# Patient Record
Sex: FEMALE | Race: WHITE | NOT HISPANIC OR LATINO | Employment: UNEMPLOYED | URBAN - METROPOLITAN AREA
[De-identification: names, ages, dates, MRNs, and addresses within clinical notes are randomized per-mention and may not be internally consistent; named-entity substitution may affect disease eponyms.]

---

## 2020-09-03 ENCOUNTER — COMMUNICATION - HEALTHEAST (OUTPATIENT)
Dept: ADDICTION MEDICINE | Facility: CLINIC | Age: 35
End: 2020-09-03

## 2020-09-08 ENCOUNTER — AMBULATORY - HEALTHEAST (OUTPATIENT)
Dept: ADDICTION MEDICINE | Facility: HOSPITAL | Age: 35
End: 2020-09-08

## 2020-09-08 ENCOUNTER — OFFICE VISIT - HEALTHEAST (OUTPATIENT)
Dept: ADDICTION MEDICINE | Facility: HOSPITAL | Age: 35
End: 2020-09-08

## 2020-09-08 DIAGNOSIS — F15.20 AMPHETAMINE USE DISORDER, SEVERE (H): ICD-10-CM

## 2020-09-11 ENCOUNTER — AMBULATORY - HEALTHEAST (OUTPATIENT)
Dept: ADDICTION MEDICINE | Facility: HOSPITAL | Age: 35
End: 2020-09-11

## 2020-09-14 ENCOUNTER — COMMUNICATION - HEALTHEAST (OUTPATIENT)
Dept: ADDICTION MEDICINE | Facility: HOSPITAL | Age: 35
End: 2020-09-14

## 2020-09-15 ENCOUNTER — COMMUNICATION - HEALTHEAST (OUTPATIENT)
Dept: ADDICTION MEDICINE | Facility: HOSPITAL | Age: 35
End: 2020-09-15

## 2020-09-16 ENCOUNTER — OFFICE VISIT - HEALTHEAST (OUTPATIENT)
Dept: ADDICTION MEDICINE | Facility: HOSPITAL | Age: 35
End: 2020-09-16

## 2020-09-16 DIAGNOSIS — F15.20 AMPHETAMINE USE DISORDER, SEVERE (H): ICD-10-CM

## 2020-09-17 ENCOUNTER — COMMUNICATION - HEALTHEAST (OUTPATIENT)
Dept: ADDICTION MEDICINE | Facility: HOSPITAL | Age: 35
End: 2020-09-17

## 2020-09-17 ENCOUNTER — AMBULATORY - HEALTHEAST (OUTPATIENT)
Dept: ADDICTION MEDICINE | Facility: HOSPITAL | Age: 35
End: 2020-09-17

## 2020-09-17 ENCOUNTER — OFFICE VISIT - HEALTHEAST (OUTPATIENT)
Dept: ADDICTION MEDICINE | Facility: HOSPITAL | Age: 35
End: 2020-09-17

## 2020-09-17 DIAGNOSIS — F15.20 AMPHETAMINE USE DISORDER, SEVERE (H): ICD-10-CM

## 2020-09-18 ENCOUNTER — AMBULATORY - HEALTHEAST (OUTPATIENT)
Dept: ADDICTION MEDICINE | Facility: HOSPITAL | Age: 35
End: 2020-09-18

## 2020-09-21 ENCOUNTER — OFFICE VISIT - HEALTHEAST (OUTPATIENT)
Dept: ADDICTION MEDICINE | Facility: HOSPITAL | Age: 35
End: 2020-09-21

## 2020-09-21 ENCOUNTER — AMBULATORY - HEALTHEAST (OUTPATIENT)
Dept: LAB | Facility: HOSPITAL | Age: 35
End: 2020-09-21

## 2020-09-21 DIAGNOSIS — F15.20 AMPHETAMINE USE DISORDER, SEVERE (H): ICD-10-CM

## 2020-09-23 ENCOUNTER — AMBULATORY - HEALTHEAST (OUTPATIENT)
Dept: ADDICTION MEDICINE | Facility: HOSPITAL | Age: 35
End: 2020-09-23

## 2020-09-23 ENCOUNTER — OFFICE VISIT - HEALTHEAST (OUTPATIENT)
Dept: ADDICTION MEDICINE | Facility: HOSPITAL | Age: 35
End: 2020-09-23

## 2020-09-23 DIAGNOSIS — F15.20 AMPHETAMINE USE DISORDER, SEVERE (H): ICD-10-CM

## 2020-09-24 ENCOUNTER — OFFICE VISIT - HEALTHEAST (OUTPATIENT)
Dept: ADDICTION MEDICINE | Facility: HOSPITAL | Age: 35
End: 2020-09-24

## 2020-09-24 ENCOUNTER — COMMUNICATION - HEALTHEAST (OUTPATIENT)
Dept: ADDICTION MEDICINE | Facility: HOSPITAL | Age: 35
End: 2020-09-24

## 2020-09-24 DIAGNOSIS — F15.20 AMPHETAMINE USE DISORDER, SEVERE (H): ICD-10-CM

## 2020-09-30 ENCOUNTER — AMBULATORY - HEALTHEAST (OUTPATIENT)
Dept: ADDICTION MEDICINE | Facility: HOSPITAL | Age: 35
End: 2020-09-30

## 2020-09-30 ENCOUNTER — COMMUNICATION - HEALTHEAST (OUTPATIENT)
Dept: ADDICTION MEDICINE | Facility: HOSPITAL | Age: 35
End: 2020-09-30

## 2020-10-02 ENCOUNTER — AMBULATORY - HEALTHEAST (OUTPATIENT)
Dept: ADDICTION MEDICINE | Facility: HOSPITAL | Age: 35
End: 2020-10-02

## 2020-10-05 ENCOUNTER — OFFICE VISIT - HEALTHEAST (OUTPATIENT)
Dept: ADDICTION MEDICINE | Facility: HOSPITAL | Age: 35
End: 2020-10-05

## 2020-10-05 DIAGNOSIS — F15.20 AMPHETAMINE USE DISORDER, SEVERE (H): ICD-10-CM

## 2020-10-05 LAB
AMPHETAMINES UR QL SCN: NORMAL
BARBITURATES UR QL: NORMAL
BENZODIAZ UR QL: NORMAL
CANNABINOIDS UR QL SCN: NORMAL
COCAINE UR QL: NORMAL
CREAT UR-MCNC: 201.6 MG/DL
ETHANOL UR CFM-MCNC: <10 MG/DL
METHADONE UR QL SCN: NORMAL
OPIATES UR QL SCN: NORMAL
OXYCODONE UR QL: NORMAL
PCP UR QL SCN: NORMAL

## 2020-10-07 ENCOUNTER — OFFICE VISIT - HEALTHEAST (OUTPATIENT)
Dept: ADDICTION MEDICINE | Facility: HOSPITAL | Age: 35
End: 2020-10-07

## 2020-10-07 DIAGNOSIS — F15.20 AMPHETAMINE USE DISORDER, SEVERE (H): ICD-10-CM

## 2020-10-08 ENCOUNTER — OFFICE VISIT - HEALTHEAST (OUTPATIENT)
Dept: ADDICTION MEDICINE | Facility: HOSPITAL | Age: 35
End: 2020-10-08

## 2020-10-08 DIAGNOSIS — F15.20 AMPHETAMINE USE DISORDER, SEVERE (H): ICD-10-CM

## 2020-10-09 ENCOUNTER — AMBULATORY - HEALTHEAST (OUTPATIENT)
Dept: ADDICTION MEDICINE | Facility: HOSPITAL | Age: 35
End: 2020-10-09

## 2020-10-12 ENCOUNTER — COMMUNICATION - HEALTHEAST (OUTPATIENT)
Dept: ADDICTION MEDICINE | Facility: HOSPITAL | Age: 35
End: 2020-10-12

## 2020-10-13 ENCOUNTER — ANESTHESIA - HEALTHEAST (OUTPATIENT)
Dept: SURGERY | Facility: CLINIC | Age: 35
End: 2020-10-13

## 2020-10-13 ENCOUNTER — SURGERY - HEALTHEAST (OUTPATIENT)
Dept: SURGERY | Facility: CLINIC | Age: 35
End: 2020-10-13

## 2020-10-13 ASSESSMENT — MIFFLIN-ST. JEOR: SCORE: 1846.65

## 2020-10-14 ENCOUNTER — COMMUNICATION - HEALTHEAST (OUTPATIENT)
Dept: SCHEDULING | Facility: CLINIC | Age: 35
End: 2020-10-14

## 2020-10-14 ENCOUNTER — COMMUNICATION - HEALTHEAST (OUTPATIENT)
Dept: ADDICTION MEDICINE | Facility: HOSPITAL | Age: 35
End: 2020-10-14

## 2020-10-15 ENCOUNTER — AMBULATORY - HEALTHEAST (OUTPATIENT)
Dept: PHARMACY | Facility: CLINIC | Age: 35
End: 2020-10-15

## 2020-10-15 ENCOUNTER — COMMUNICATION - HEALTHEAST (OUTPATIENT)
Dept: ADDICTION MEDICINE | Facility: HOSPITAL | Age: 35
End: 2020-10-15

## 2020-10-16 ENCOUNTER — INFUSION - HEALTHEAST (OUTPATIENT)
Dept: INFUSION THERAPY | Facility: CLINIC | Age: 35
End: 2020-10-16

## 2020-10-16 ENCOUNTER — AMBULATORY - HEALTHEAST (OUTPATIENT)
Dept: ADDICTION MEDICINE | Facility: HOSPITAL | Age: 35
End: 2020-10-16

## 2020-10-16 DIAGNOSIS — M00.9 SEPTIC ARTHRITIS OF AC JOINT (H): ICD-10-CM

## 2020-10-16 RX ORDER — HEPARIN SODIUM (PORCINE) LOCK FLUSH IV SOLN 100 UNIT/ML 100 UNIT/ML
5 SOLUTION INTRAVENOUS
Status: CANCELLED | OUTPATIENT
Start: 2020-10-17

## 2020-10-16 RX ORDER — HEPARIN SODIUM,PORCINE 10 UNIT/ML
5 VIAL (ML) INTRAVENOUS
Status: CANCELLED | OUTPATIENT
Start: 2020-10-17

## 2020-10-16 RX ORDER — CEFTRIAXONE SODIUM 2 G
2 VIAL (EA) INJECTION ONCE
Status: CANCELLED
Start: 2020-10-17 | End: 2020-10-17

## 2020-10-17 ENCOUNTER — INFUSION THERAPY VISIT (OUTPATIENT)
Dept: INFUSION THERAPY | Facility: CLINIC | Age: 35
End: 2020-10-17
Attending: INTERNAL MEDICINE
Payer: MEDICAID

## 2020-10-17 VITALS
RESPIRATION RATE: 16 BRPM | TEMPERATURE: 97.7 F | OXYGEN SATURATION: 98 % | DIASTOLIC BLOOD PRESSURE: 88 MMHG | SYSTOLIC BLOOD PRESSURE: 137 MMHG | HEART RATE: 91 BPM

## 2020-10-17 DIAGNOSIS — M00.9 SEPTIC ARTHRITIS OF AC JOINT (H): Primary | ICD-10-CM

## 2020-10-17 PROCEDURE — 99207 PR NO CHARGE LOS: CPT

## 2020-10-17 PROCEDURE — 258N000003 HC RX IP 258 OP 636: Performed by: INTERNAL MEDICINE

## 2020-10-17 PROCEDURE — 250N000011 HC RX IP 250 OP 636: Performed by: INTERNAL MEDICINE

## 2020-10-17 PROCEDURE — 96365 THER/PROPH/DIAG IV INF INIT: CPT

## 2020-10-17 RX ORDER — CEFTRIAXONE SODIUM 2 G
2 VIAL (EA) INJECTION ONCE
Status: CANCELLED
Start: 2020-10-18 | End: 2020-10-18

## 2020-10-17 RX ORDER — HEPARIN SODIUM (PORCINE) LOCK FLUSH IV SOLN 100 UNIT/ML 100 UNIT/ML
5 SOLUTION INTRAVENOUS
Status: CANCELLED | OUTPATIENT
Start: 2020-10-18

## 2020-10-17 RX ORDER — HEPARIN SODIUM,PORCINE 10 UNIT/ML
5 VIAL (ML) INTRAVENOUS
Status: CANCELLED | OUTPATIENT
Start: 2020-10-18

## 2020-10-17 RX ADMIN — CEFTRIAXONE SODIUM 2 G: 2 INJECTION, POWDER, FOR SOLUTION INTRAMUSCULAR; INTRAVENOUS at 11:17

## 2020-10-17 NOTE — PATIENT INSTRUCTIONS
Dear Ama Roman    Thank you for choosing [facility] ([facility]) for your infusion.  The following information is a summary of our appointment as well as important reminders.      We look forward in seeing you on your next appointment here at [facility] ([facility]).  Please don t hesitate to call us at [phone] to reschedule any of your appointments or to speak with one of the [facility] registered nurses.  It was a pleasure taking care of you today.    Sincerely,    [facility]  [address] [place] [address]  [address]  Phone:  [phone]  Patient Education     Ceftriaxone Sodium Solution for injection  What is this medicine?  CEFTRIAXONE (sef try AX one) is a cephalosporin antibiotic. It is used to treat certain kinds of bacterial infections. It will not work for colds, flu, or other viral infections.  This medicine may be used for other purposes; ask your health care provider or pharmacist if you have questions.  What should I tell my health care provider before I take this medicine?  They need to know if you have any of these conditions:    any chronic illness    bowel disease, like colitis    both kidney and liver disease    high bilirubin level in  patients    an unusual or allergic reaction to ceftriaxone, other cephalosporin or penicillin antibiotics, foods, dyes, or preservatives    pregnant or trying to get pregnant    breast-feeding  How should I use this medicine?  This medicine is injected into a muscle or infused it into a vein. It is usually given in a medical office or clinic. If you are to give this medicine you will be taught how to inject it. Follow instructions carefully. Use your doses at regular intervals. Do not take your medicine more often than directed. Do not skip doses or stop your medicine early even if you feel better. Do not  stop taking except on your doctor's advice.  Talk to your pediatrician regarding the use of this medicine in children. Special care may be needed.  Overdosage: If you think you have taken too much of this medicine contact a poison control center or emergency room at once.  NOTE: This medicine is only for you. Do not share this medicine with others.  What if I miss a dose?  If you miss a dose, take it as soon as you can. If it is almost time for your next dose, take only that dose. Do not take double or extra doses.  What may interact with this medicine?  Do not take this medicine with any of the following medications:    intravenous calcium  This medicine may also interact with the following medications:    birth control pills  This list may not describe all possible interactions. Give your health care provider a list of all the medicines, herbs, non-prescription drugs, or dietary supplements you use. Also tell them if you smoke, drink alcohol, or use illegal drugs. Some items may interact with your medicine.  What should I watch for while using this medicine?  Tell your doctor or health care professional if your symptoms do not improve or if they get worse.  Do not treat diarrhea with over the counter products. Contact your doctor if you have diarrhea that lasts more than 2 days or if it is severe and watery.  If you are being treated for a sexually transmitted disease, avoid sexual contact until you have finished your treatment. Having sex can infect your sexual partner.  Calcium may bind to this medicine and cause lung or kidney problems. Avoid calcium products while taking this medicine and for 48 hours after taking the last dose of this medicine.  What side effects may I notice from receiving this medicine?  Side effects that you should report to your doctor or health care professional as soon as possible:    allergic reactions like skin rash, itching or hives, swelling of the face, lips, or tongue    breathing  problems    fever, chills    irregular heartbeat    pain when passing urine    seizures    stomach pain, cramps    unusual bleeding, bruising    unusually weak or tired  Side effects that usually do not require medical attention (report to your doctor or health care professional if they continue or are bothersome):    diarrhea    dizzy, drowsy    headache    nausea, vomiting    pain, swelling, irritation where injected    stomach upset    sweating  This list may not describe all possible side effects. Call your doctor for medical advice about side effects. You may report side effects to FDA at 2-587-FDA-6091.  Where should I keep my medicine?  Keep out of the reach of children.  Store at room temperature below 25 degrees C (77 degrees F). Protect from light. Throw away any unused vials after the expiration date.  NOTE:This sheet is a summary. It may not cover all possible information. If you have questions about this medicine, talk to your doctor, pharmacist, or health care provider. Copyright  2016 Gold Standard

## 2020-10-17 NOTE — PROGRESS NOTES
Nursing Note  Ama Roman [facility] for:   Chief Complaint   Patient presents with     Infusion     Rocephin     During [facility]'s [facility] appointment, orders from Dr. Velez were completed.  Frequency: daily until 11/22/20.    Progress note:  Patient identification verified by name and date of birth.  Assessment completed.  Vitals recorded in Doc Flowsheets.  Patient was provided with education regarding medication/procedure and possible side effects.  Patient verbalized understanding.     present during visit today: Not Applicable.    Treatment Conditions: Non-applicable.    Premedications: were not ordered.    Drug Waste Record: No    Infusion length and rate:  Rocephin given over approximately 30 minutes.    Labs: were not ordered for this appointment.    Vascular access: peripheral IV placed today.    Post Infusion Assessment:  Patient tolerated infusion without incident.  No evidence of extravasations.  Access discontinued per protocol.     Discharge Plan:   Follow up plan of care with: ongoing infusions at [facility] on weekends and weekdays at [facility].  Discharge instructions were reviewed with patient.  Patient/representative verbalized understanding of discharge instructions and all questions answered.  Patient discharged from [facility] in stable condition.    Dyan Garcia RN        /89   Pulse 91   Temp 97.7  F (36.5  C) (Oral)   Resp 16   SpO2 98%     Administrations This Visit     cefTRIAXone (ROCEPHIN) 2 g in sodium chloride 0.9% 100 mL     Admin Date  10/17/2020 Action  New Bag Dose  2 g Rate  260 mL/hr Route  Intravenous Administered By  Dyan Garcia RN

## 2020-10-17 NOTE — LETTER
10/17/2020         RE: Ama Roman  [address]  [address]  [place] [address]        Dear Colleague,    Thank you for referring your patient, Ama Roman, to the [facility]. Please see a copy of my visit note below.    Nursing Note  Ama Roman [facility] for:   Chief Complaint   Patient presents with     Infusion     Rocephin     During [facility]'s [facility] appointment, orders from Dr. Velez were completed.  Frequency: daily until 11/22/20.    Progress note:  Patient identification verified by name and date of birth.  Assessment completed.  Vitals recorded in Doc Flowsheets.  Patient was provided with education regarding medication/procedure and possible side effects.  Patient verbalized understanding.     present during visit today: Not Applicable.    Treatment Conditions: Non-applicable.    Premedications: were not ordered.    Drug Waste Record: No    Infusion length and rate:  Rocephin given over approximately 30 minutes.    Labs: were not ordered for this appointment.    Vascular access: peripheral IV placed today.    Post Infusion Assessment:  Patient tolerated infusion without incident.  No evidence of extravasations.  Access discontinued per protocol.     Discharge Plan:   Follow up plan of care with: ongoing infusions at [facility] on weekends and weekdays at [facility].  Discharge instructions were reviewed with patient.  Patient/representative verbalized understanding of discharge instructions and all questions answered.  Patient discharged from [facility] in stable condition.    Dyan Garcia RN        /89   Pulse 91   Temp 97.7  F (36.5  C) (Oral)   Resp 16   SpO2 98%     Administrations This Visit     cefTRIAXone (ROCEPHIN) 2 g in sodium chloride 0.9% 100 mL     Admin Date  10/17/2020  Action  New Bag Dose  2 g Rate  260 mL/hr Route  Intravenous Administered By  Dyan Garcia RN                    Again, thank you for allowing me to participate in the care of your patient.        Sincerely,        [facility]

## 2020-10-18 ENCOUNTER — INFUSION THERAPY VISIT (OUTPATIENT)
Dept: INFUSION THERAPY | Facility: CLINIC | Age: 35
End: 2020-10-18
Attending: INTERNAL MEDICINE
Payer: MEDICAID

## 2020-10-18 VITALS
DIASTOLIC BLOOD PRESSURE: 83 MMHG | SYSTOLIC BLOOD PRESSURE: 130 MMHG | HEART RATE: 86 BPM | TEMPERATURE: 97.4 F | OXYGEN SATURATION: 97 %

## 2020-10-18 DIAGNOSIS — M00.9 SEPTIC ARTHRITIS OF AC JOINT (H): Primary | ICD-10-CM

## 2020-10-18 PROCEDURE — 250N000011 HC RX IP 250 OP 636: Performed by: INTERNAL MEDICINE

## 2020-10-18 PROCEDURE — 258N000003 HC RX IP 258 OP 636: Performed by: INTERNAL MEDICINE

## 2020-10-18 PROCEDURE — 96365 THER/PROPH/DIAG IV INF INIT: CPT

## 2020-10-18 RX ORDER — HEPARIN SODIUM (PORCINE) LOCK FLUSH IV SOLN 100 UNIT/ML 100 UNIT/ML
5 SOLUTION INTRAVENOUS
Status: CANCELLED | OUTPATIENT
Start: 2020-10-24

## 2020-10-18 RX ORDER — HEPARIN SODIUM,PORCINE 10 UNIT/ML
5 VIAL (ML) INTRAVENOUS
Status: CANCELLED | OUTPATIENT
Start: 2020-10-24

## 2020-10-18 RX ADMIN — CEFTRIAXONE SODIUM 2 G: 2 INJECTION, POWDER, FOR SOLUTION INTRAMUSCULAR; INTRAVENOUS at 11:16

## 2020-10-18 NOTE — PROGRESS NOTES
Nursing Note  Ama Roman presents [facility] for:   Chief Complaint   Patient presents with     Infusion     ROCEPHIN     During [facility]'s [facility] appointment, orders from Dr. Velez were completed.  Frequency: daily until 11/22/20. [facility] on weekends and weekdays at [facility].    Progress note:  Patient identification verified by name and date of birth.  Assessment completed.  Vitals recorded in Doc Flowsheets.  Patient was provided with education regarding medication/procedure and possible side effects.  Patient verbalized understanding.     present during visit today: Not Applicable.    Treatment Conditions: Non-applicable.    Premedications: were not ordered.    Drug Waste Record: No    Infusion length and rate:  Rocephin given over approximately 30 minutes.    Labs: were not ordered for this appointment.    Vascular access: peripheral IV placed today.    Post Infusion Assessment:  Patient tolerated infusion without incident.  No evidence of extravasations.  Access discontinued per protocol.     Discharge Plan:   Follow up plan of care with: ongoing infusions at [facility] on weekends and weekdays at [facility].  Discharge instructions were reviewed with patient.  Patient/representative verbalized understanding of discharge instructions and all questions answered.  Patient discharged from [facility] in stable condition.    Honey Tilley RN    Administrations This Visit     cefTRIAXone (ROCEPHIN) 2 g in sodium chloride 0.9 % 100 mL intermittent infusion     Admin Date  10/18/2020 Action  New Bag Dose  2 g Rate  260 mL/hr Route  Intravenous Administered By  Honey Tilley, RN                /83   Pulse 86   Temp 97.4  F (36.3  C) (Oral)   SpO2 97%

## 2020-10-18 NOTE — LETTER
10/18/2020         RE: Ama Roman  [address]  [address]  [place] [address]        Dear Colleague,    Thank you for referring your patient, Ama Roman, to the [facility]. Please see a copy of my visit note below.    Nursing Note  Ama Roman presents [facility] for:   Chief Complaint   Patient presents with     Infusion     ROCEPHIN     During [facility]'s [facility] appointment, orders from Dr. Velez were completed.  Frequency: daily until 11/22/20. [facility] on weekends and weekdays at [facility].    Progress note:  Patient identification verified by name and date of birth.  Assessment completed.  Vitals recorded in Doc Flowsheets.  Patient was provided with education regarding medication/procedure and possible side effects.  Patient verbalized understanding.     present during visit today: Not Applicable.    Treatment Conditions: Non-applicable.    Premedications: were not ordered.    Drug Waste Record: No    Infusion length and rate:  Rocephin given over approximately 30 minutes.    Labs: were not ordered for this appointment.    Vascular access: peripheral IV placed today.    Post Infusion Assessment:  Patient tolerated infusion without incident.  No evidence of extravasations.  Access discontinued per protocol.     Discharge Plan:   Follow up plan of care with: ongoing infusions at [facility] on weekends and weekdays at [facility].  Discharge instructions were reviewed with patient.  Patient/representative verbalized understanding of discharge instructions and all questions answered.  Patient discharged from [facility] in stable condition.    Honey Tilley RN    Administrations This Visit     cefTRIAXone (ROCEPHIN) 2 g in sodium chloride 0.9 % 100 mL intermittent infusion     Admin  Date  10/18/2020 Action  New Bag Dose  2 g Rate  260 mL/hr Route  Intravenous Administered By  Honey Tilley, RN                /83   Pulse 86   Temp 97.4  F (36.3  C) (Oral)   SpO2 97%           Again, thank you for allowing me to participate in the care of your patient.        Sincerely,        [facility]

## 2020-10-19 ENCOUNTER — COMMUNICATION - HEALTHEAST (OUTPATIENT)
Dept: ADDICTION MEDICINE | Facility: HOSPITAL | Age: 35
End: 2020-10-19

## 2020-10-19 ENCOUNTER — INFUSION - HEALTHEAST (OUTPATIENT)
Dept: INFUSION THERAPY | Facility: CLINIC | Age: 35
End: 2020-10-19

## 2020-10-19 ENCOUNTER — OFFICE VISIT - HEALTHEAST (OUTPATIENT)
Dept: ADDICTION MEDICINE | Facility: HOSPITAL | Age: 35
End: 2020-10-19

## 2020-10-19 DIAGNOSIS — M00.9 SEPTIC ARTHRITIS OF AC JOINT (H): ICD-10-CM

## 2020-10-19 DIAGNOSIS — F15.20 AMPHETAMINE USE DISORDER, SEVERE (H): ICD-10-CM

## 2020-10-20 ENCOUNTER — INFUSION - HEALTHEAST (OUTPATIENT)
Dept: INFUSION THERAPY | Facility: CLINIC | Age: 35
End: 2020-10-20

## 2020-10-20 ENCOUNTER — AMBULATORY - HEALTHEAST (OUTPATIENT)
Dept: ADDICTION MEDICINE | Facility: HOSPITAL | Age: 35
End: 2020-10-20

## 2020-10-20 DIAGNOSIS — M00.9 SEPTIC ARTHRITIS OF AC JOINT (H): ICD-10-CM

## 2020-10-21 ENCOUNTER — OFFICE VISIT - HEALTHEAST (OUTPATIENT)
Dept: ADDICTION MEDICINE | Facility: HOSPITAL | Age: 35
End: 2020-10-21

## 2020-10-21 ENCOUNTER — INFUSION - HEALTHEAST (OUTPATIENT)
Dept: INFUSION THERAPY | Facility: CLINIC | Age: 35
End: 2020-10-21

## 2020-10-21 DIAGNOSIS — M00.9 SEPTIC ARTHRITIS OF AC JOINT (H): ICD-10-CM

## 2020-10-21 DIAGNOSIS — F15.20 AMPHETAMINE USE DISORDER, SEVERE (H): ICD-10-CM

## 2020-10-21 LAB
BASOPHILS # BLD AUTO: 0.1 THOU/UL (ref 0–0.2)
BASOPHILS NFR BLD AUTO: 1 % (ref 0–2)
C REACTIVE PROTEIN LHE: 1.5 MG/DL (ref 0–0.8)
CREAT SERPL-MCNC: 0.71 MG/DL (ref 0.6–1.1)
EOSINOPHIL # BLD AUTO: 0.3 THOU/UL (ref 0–0.4)
EOSINOPHIL NFR BLD AUTO: 2 % (ref 0–6)
ERYTHROCYTE [DISTWIDTH] IN BLOOD BY AUTOMATED COUNT: 14.4 % (ref 11–14.5)
GFR SERPL CREATININE-BSD FRML MDRD: >60 ML/MIN/1.73M2
HCT VFR BLD AUTO: 32.5 % (ref 35–47)
HGB BLD-MCNC: 10.6 G/DL (ref 12–16)
IMM GRANULOCYTES # BLD: 0.6 THOU/UL
IMM GRANULOCYTES NFR BLD: 5 %
LYMPHOCYTES # BLD AUTO: 3 THOU/UL (ref 0.8–4.4)
LYMPHOCYTES NFR BLD AUTO: 23 % (ref 20–40)
MCH RBC QN AUTO: 26 PG (ref 27–34)
MCHC RBC AUTO-ENTMCNC: 32.6 G/DL (ref 32–36)
MCV RBC AUTO: 80 FL (ref 80–100)
MONOCYTES # BLD AUTO: 0.7 THOU/UL (ref 0–0.9)
MONOCYTES NFR BLD AUTO: 5 % (ref 2–10)
NEUTROPHILS # BLD AUTO: 8.5 THOU/UL (ref 2–7.7)
NEUTROPHILS NFR BLD AUTO: 65 % (ref 50–70)
PLATELET # BLD AUTO: 467 THOU/UL (ref 140–440)
PMV BLD AUTO: 9 FL (ref 8.5–12.5)
RBC # BLD AUTO: 4.07 MILL/UL (ref 3.8–5.4)
WBC: 13.2 THOU/UL (ref 4–11)

## 2020-10-22 ENCOUNTER — INFUSION - HEALTHEAST (OUTPATIENT)
Dept: INFUSION THERAPY | Facility: CLINIC | Age: 35
End: 2020-10-22

## 2020-10-22 DIAGNOSIS — M00.9 SEPTIC ARTHRITIS OF AC JOINT (H): ICD-10-CM

## 2020-10-23 ENCOUNTER — INFUSION - HEALTHEAST (OUTPATIENT)
Dept: INFUSION THERAPY | Facility: CLINIC | Age: 35
End: 2020-10-23

## 2020-10-23 DIAGNOSIS — M00.9 SEPTIC ARTHRITIS OF AC JOINT (H): ICD-10-CM

## 2020-10-24 ENCOUNTER — INFUSION THERAPY VISIT (OUTPATIENT)
Dept: INFUSION THERAPY | Facility: CLINIC | Age: 35
End: 2020-10-24
Attending: INTERNAL MEDICINE
Payer: MEDICAID

## 2020-10-24 VITALS
SYSTOLIC BLOOD PRESSURE: 128 MMHG | HEART RATE: 87 BPM | DIASTOLIC BLOOD PRESSURE: 85 MMHG | TEMPERATURE: 97.7 F | RESPIRATION RATE: 18 BRPM | OXYGEN SATURATION: 98 %

## 2020-10-24 DIAGNOSIS — M00.9 SEPTIC ARTHRITIS OF AC JOINT (H): Primary | ICD-10-CM

## 2020-10-24 PROCEDURE — 96365 THER/PROPH/DIAG IV INF INIT: CPT

## 2020-10-24 PROCEDURE — 99207 PR NO CHARGE LOS: CPT

## 2020-10-24 PROCEDURE — 258N000003 HC RX IP 258 OP 636: Performed by: INTERNAL MEDICINE

## 2020-10-24 PROCEDURE — 250N000011 HC RX IP 250 OP 636: Performed by: INTERNAL MEDICINE

## 2020-10-24 RX ORDER — ACETAMINOPHEN 500 MG
1000 TABLET ORAL
COMMUNITY
Start: 2020-10-15

## 2020-10-24 RX ORDER — HEPARIN SODIUM (PORCINE) LOCK FLUSH IV SOLN 100 UNIT/ML 100 UNIT/ML
5 SOLUTION INTRAVENOUS
Status: CANCELLED | OUTPATIENT
Start: 2020-10-25

## 2020-10-24 RX ORDER — HEPARIN SODIUM,PORCINE 10 UNIT/ML
5 VIAL (ML) INTRAVENOUS
Status: CANCELLED | OUTPATIENT
Start: 2020-10-25

## 2020-10-24 RX ADMIN — CEFTRIAXONE SODIUM 2 G: 2 INJECTION, POWDER, FOR SOLUTION INTRAMUSCULAR; INTRAVENOUS at 11:29

## 2020-10-24 NOTE — PROGRESS NOTES
Nursing Note  Ama Roman presents [facility] for:   Chief Complaint   Patient presents with     Infusion     Rocephin     During today's [facility] appointment, orders from Dr. Velez were completed.  Frequency: daily until 11/22/20. [facility] on weekends and weekdays at [facility].    Progress note:  Patient identification verified by name and date of birth.  Assessment completed.  Vitals recorded in Doc Flowsheets.  Patient was provided with education regarding medication/procedure and possible side effects.  Patient verbalized understanding.     present during visit today: Not Applicable.    Treatment Conditions: Non-applicable.    Premedications: were not ordered.    Drug Waste Record: No    Infusion length and rate:  Rocephin given over approximately 30 minutes.    Labs: were not ordered for this appointment.    Vascular access: peripheral IV placed today by nurse.    Post Infusion Assessment:  Patient tolerated infusion without incident.  No evidence of extravasations.  Access discontinued per protocol.     Discharge Plan:   Follow up plan of care with: ongoing infusions at [facility] on weekends and weekdays at [facility].  Discharge instructions were reviewed with patient.  Patient/representative verbalized understanding of discharge instructions and all questions answered.  Patient discharged from [facility] in stable condition.    Tianna Zuniga RN    Administrations This Visit     cefTRIAXone (ROCEPHIN) 2 g in sodium chloride 0.9 % 100 mL intermittent infusion     Admin Date  10/24/2020 Action  New Bag Dose  2 g Rate  260 mL/hr Route  Intravenous Administered By  Tianna Zuniga RN                /85   Pulse 87   Temp 97.7  F (36.5  C) (Oral)   Resp 18   SpO2 98%

## 2020-10-24 NOTE — LETTER
10/24/2020         RE: Ama Roman  [address]  [address]  [place] [address]        Dear Colleague,    Thank you for referring your patient, Ama Roman, to the [facility]. Please see a copy of my visit note below.    Nursing Note  Ama Roman presents [facility] for:   Chief Complaint   Patient presents with     Infusion     Rocephin     During today's [facility] appointment, orders from Dr. Velez were completed.  Frequency: daily until 11/22/20. [facility] on weekends and weekdays at [facility].    Progress note:  Patient identification verified by name and date of birth.  Assessment completed.  Vitals recorded in Doc Flowsheets.  Patient was provided with education regarding medication/procedure and possible side effects.  Patient verbalized understanding.     present during visit today: Not Applicable.    Treatment Conditions: Non-applicable.    Premedications: were not ordered.    Drug Waste Record: No    Infusion length and rate:  Rocephin given over approximately 30 minutes.    Labs: were not ordered for this appointment.    Vascular access: peripheral IV placed today by nurse.    Post Infusion Assessment:  Patient tolerated infusion without incident.  No evidence of extravasations.  Access discontinued per protocol.     Discharge Plan:   Follow up plan of care with: ongoing infusions at [facility] on weekends and weekdays at [facility].  Discharge instructions were reviewed with patient.  Patient/representative verbalized understanding of discharge instructions and all questions answered.  Patient discharged from [facility] in stable condition.    Tianna Saucedo RN    Administrations This Visit     cefTRIAXone (ROCEPHIN) 2 g in sodium chloride 0.9 % 100 mL intermittent infusion      Admin Date  10/24/2020 Action  New Bag Dose  2 g Rate  260 mL/hr Route  Intravenous Administered By  Tianna Zuniga RN                /85   Pulse 87   Temp 97.7  F (36.5  C) (Oral)   Resp 18   SpO2 98%         Again, thank you for allowing me to participate in the care of your patient.        Sincerely,        [facility]

## 2020-10-25 ENCOUNTER — INFUSION THERAPY VISIT (OUTPATIENT)
Dept: INFUSION THERAPY | Facility: CLINIC | Age: 35
End: 2020-10-25
Attending: INTERNAL MEDICINE
Payer: MEDICAID

## 2020-10-25 VITALS
OXYGEN SATURATION: 97 % | RESPIRATION RATE: 16 BRPM | HEART RATE: 90 BPM | SYSTOLIC BLOOD PRESSURE: 148 MMHG | TEMPERATURE: 98.3 F | DIASTOLIC BLOOD PRESSURE: 91 MMHG

## 2020-10-25 DIAGNOSIS — M00.9 SEPTIC ARTHRITIS OF AC JOINT (H): Primary | ICD-10-CM

## 2020-10-25 PROCEDURE — 258N000003 HC RX IP 258 OP 636: Performed by: INTERNAL MEDICINE

## 2020-10-25 PROCEDURE — 96365 THER/PROPH/DIAG IV INF INIT: CPT

## 2020-10-25 PROCEDURE — 99207 PR NO CHARGE LOS: CPT

## 2020-10-25 PROCEDURE — 250N000011 HC RX IP 250 OP 636: Performed by: INTERNAL MEDICINE

## 2020-10-25 RX ORDER — HEPARIN SODIUM (PORCINE) LOCK FLUSH IV SOLN 100 UNIT/ML 100 UNIT/ML
5 SOLUTION INTRAVENOUS
Status: CANCELLED | OUTPATIENT
Start: 2020-10-31

## 2020-10-25 RX ORDER — HEPARIN SODIUM,PORCINE 10 UNIT/ML
5 VIAL (ML) INTRAVENOUS
Status: CANCELLED | OUTPATIENT
Start: 2020-10-31

## 2020-10-25 RX ADMIN — CEFTRIAXONE SODIUM 2 G: 2 INJECTION, POWDER, FOR SOLUTION INTRAMUSCULAR; INTRAVENOUS at 11:43

## 2020-10-25 NOTE — LETTER
10/25/2020         RE: Ama Roman  [address]  [address]  [place] [address]        Dear Colleague,    Thank you for referring your patient, Ama Roman, to the [facility]. Please see a copy of my visit note below.    Nursing Note  Ama Roman presents today [facility] for:   Chief Complaint   Patient presents with     Infusion     Rocephin     During today's [facility] appointment, orders from Dr. Velez were completed.  Frequency: daily until 11/22/20. [facility] on weekends and weekdays at [facility].    Progress note:  Patient identification verified by name and date of birth.  Assessment completed.  Vitals recorded in Doc Flowsheets.  Patient was provided with education regarding medication/procedure and possible side effects.  Patient verbalized understanding.     present during visit today: Not Applicable.    Treatment Conditions: Non-applicable.    Premedications: were not ordered.    Drug Waste Record: No    Infusion length and rate:  Rocephin given over approximately 30 minutes.    Labs: were not ordered for this appointment.    Vascular access: peripheral IV placed today by nurse.    Post Infusion Assessment:  Patient tolerated infusion without incident.  No evidence of extravasations.  Access discontinued per protocol.     Discharge Plan:   Follow up plan of care with: ongoing infusions at [facility] on weekends and weekdays at [facility].  Discharge instructions were reviewed with patient.  Patient/representative verbalized understanding of discharge instructions and all questions answered.  Patient discharged from [facility] in stable condition.    Dyan Garcia RN    Administrations This Visit     cefTRIAXone (ROCEPHIN) 2 g in sodium chloride 0.9 % 100 mL intermittent infusion      Admin Date  10/25/2020 Action  New Bag Dose  2 g Rate  260 mL/hr Route  Intravenous Administered By  Dyan Garcia, RN                BP (!) 149/90   Pulse 90   Temp 98.3  F (36.8  C) (Oral)   Resp 16   SpO2 97%       Again, thank you for allowing me to participate in the care of your patient.        Sincerely,        [facility]

## 2020-10-25 NOTE — PATIENT INSTRUCTIONS
Dear Ama Roman    Thank you for choosing [facility] ([facility]) for your infusion.  The following information is a summary of our appointment as well as important reminders.      We look forward in seeing you on your next appointment here at [facility] ([facility]).  Please don t hesitate to call us at [phone] to reschedule any of your appointments or to speak with one of the [facility] registered nurses.  It was a pleasure taking care of you today.    Sincerely,    [facility]  [address] [place] [address]  [address]  Phone:  [phone]

## 2020-10-25 NOTE — PROGRESS NOTES
Nursing Note  Ama Roman presents today [facility] for:   Chief Complaint   Patient presents with     Infusion     Rocephin     During today's [facility] appointment, orders from Dr. Velez were completed.  Frequency: daily until 11/22/20. [facility] on weekends and weekdays at [facility].    Progress note:  Patient identification verified by name and date of birth.  Assessment completed.  Vitals recorded in Doc Flowsheets.  Patient was provided with education regarding medication/procedure and possible side effects.  Patient verbalized understanding.     present during visit today: Not Applicable.    Treatment Conditions: Non-applicable.    Premedications: were not ordered.    Drug Waste Record: No    Infusion length and rate:  Rocephin given over approximately 30 minutes.    Labs: were not ordered for this appointment.    Vascular access: peripheral IV placed today by nurse.    Post Infusion Assessment:  Patient tolerated infusion without incident.  No evidence of extravasations.  Access discontinued per protocol.     Discharge Plan:   Follow up plan of care with: ongoing infusions at [facility] on weekends and weekdays at [facility].  Discharge instructions were reviewed with patient.  Patient/representative verbalized understanding of discharge instructions and all questions answered.  Patient discharged from [facility] in stable condition.    Dyan Garcia RN    Administrations This Visit     cefTRIAXone (ROCEPHIN) 2 g in sodium chloride 0.9 % 100 mL intermittent infusion     Admin Date  10/25/2020 Action  New Bag Dose  2 g Rate  260 mL/hr Route  Intravenous Administered By  Dyan Garcia RN                BP (!) 149/90   Pulse 90   Temp 98.3  F (36.8  C) (Oral)   Resp 16   SpO2 97%

## 2020-10-26 ENCOUNTER — INFUSION - HEALTHEAST (OUTPATIENT)
Dept: INFUSION THERAPY | Facility: CLINIC | Age: 35
End: 2020-10-26

## 2020-10-26 ENCOUNTER — OFFICE VISIT - HEALTHEAST (OUTPATIENT)
Dept: ADDICTION MEDICINE | Facility: HOSPITAL | Age: 35
End: 2020-10-26

## 2020-10-26 ENCOUNTER — AMBULATORY - HEALTHEAST (OUTPATIENT)
Dept: ADDICTION MEDICINE | Facility: HOSPITAL | Age: 35
End: 2020-10-26

## 2020-10-26 DIAGNOSIS — F15.20 AMPHETAMINE USE DISORDER, SEVERE (H): ICD-10-CM

## 2020-10-26 DIAGNOSIS — M00.9 SEPTIC ARTHRITIS OF AC JOINT (H): ICD-10-CM

## 2020-10-27 ENCOUNTER — INFUSION - HEALTHEAST (OUTPATIENT)
Dept: INFUSION THERAPY | Facility: CLINIC | Age: 35
End: 2020-10-27

## 2020-10-27 DIAGNOSIS — M00.9 SEPTIC ARTHRITIS OF AC JOINT (H): ICD-10-CM

## 2020-10-28 ENCOUNTER — OFFICE VISIT - HEALTHEAST (OUTPATIENT)
Dept: ADDICTION MEDICINE | Facility: HOSPITAL | Age: 35
End: 2020-10-28

## 2020-10-28 ENCOUNTER — INFUSION - HEALTHEAST (OUTPATIENT)
Dept: INFUSION THERAPY | Facility: CLINIC | Age: 35
End: 2020-10-28

## 2020-10-28 DIAGNOSIS — M00.9 SEPTIC ARTHRITIS OF AC JOINT (H): ICD-10-CM

## 2020-10-28 DIAGNOSIS — F15.20 AMPHETAMINE USE DISORDER, SEVERE (H): ICD-10-CM

## 2020-10-28 LAB
BASOPHILS # BLD AUTO: 0.1 THOU/UL (ref 0–0.2)
BASOPHILS NFR BLD AUTO: 1 % (ref 0–2)
C REACTIVE PROTEIN LHE: 0.6 MG/DL (ref 0–0.8)
CREAT SERPL-MCNC: 0.7 MG/DL (ref 0.6–1.1)
EOSINOPHIL # BLD AUTO: 0.2 THOU/UL (ref 0–0.4)
EOSINOPHIL NFR BLD AUTO: 3 % (ref 0–6)
ERYTHROCYTE [DISTWIDTH] IN BLOOD BY AUTOMATED COUNT: 14.3 % (ref 11–14.5)
GFR SERPL CREATININE-BSD FRML MDRD: >60 ML/MIN/1.73M2
HCT VFR BLD AUTO: 32.7 % (ref 35–47)
HGB BLD-MCNC: 10.6 G/DL (ref 12–16)
IMM GRANULOCYTES # BLD: 0 THOU/UL
IMM GRANULOCYTES NFR BLD: 0 %
LYMPHOCYTES # BLD AUTO: 2.3 THOU/UL (ref 0.8–4.4)
LYMPHOCYTES NFR BLD AUTO: 32 % (ref 20–40)
MCH RBC QN AUTO: 25.6 PG (ref 27–34)
MCHC RBC AUTO-ENTMCNC: 32.4 G/DL (ref 32–36)
MCV RBC AUTO: 79 FL (ref 80–100)
MONOCYTES # BLD AUTO: 0.5 THOU/UL (ref 0–0.9)
MONOCYTES NFR BLD AUTO: 6 % (ref 2–10)
NEUTROPHILS # BLD AUTO: 4.1 THOU/UL (ref 2–7.7)
NEUTROPHILS NFR BLD AUTO: 58 % (ref 50–70)
PLATELET # BLD AUTO: 522 THOU/UL (ref 140–440)
PMV BLD AUTO: 8.9 FL (ref 8.5–12.5)
RBC # BLD AUTO: 4.14 MILL/UL (ref 3.8–5.4)
WBC: 7.2 THOU/UL (ref 4–11)

## 2020-10-29 ENCOUNTER — COMMUNICATION - HEALTHEAST (OUTPATIENT)
Dept: ADMINISTRATIVE | Facility: CLINIC | Age: 35
End: 2020-10-29

## 2020-10-29 ENCOUNTER — INFUSION - HEALTHEAST (OUTPATIENT)
Dept: INFUSION THERAPY | Facility: CLINIC | Age: 35
End: 2020-10-29

## 2020-10-29 ENCOUNTER — OFFICE VISIT - HEALTHEAST (OUTPATIENT)
Dept: ADDICTION MEDICINE | Facility: HOSPITAL | Age: 35
End: 2020-10-29

## 2020-10-29 DIAGNOSIS — M00.9 SEPTIC ARTHRITIS OF AC JOINT (H): ICD-10-CM

## 2020-10-29 DIAGNOSIS — F15.20 AMPHETAMINE USE DISORDER, SEVERE (H): ICD-10-CM

## 2020-10-30 ENCOUNTER — INFUSION - HEALTHEAST (OUTPATIENT)
Dept: INFUSION THERAPY | Facility: CLINIC | Age: 35
End: 2020-10-30

## 2020-10-30 DIAGNOSIS — M00.9 SEPTIC ARTHRITIS OF AC JOINT (H): ICD-10-CM

## 2020-10-31 ENCOUNTER — INFUSION THERAPY VISIT (OUTPATIENT)
Dept: INFUSION THERAPY | Facility: CLINIC | Age: 35
End: 2020-10-31
Attending: INTERNAL MEDICINE
Payer: MEDICAID

## 2020-10-31 VITALS
HEART RATE: 90 BPM | OXYGEN SATURATION: 97 % | RESPIRATION RATE: 16 BRPM | DIASTOLIC BLOOD PRESSURE: 90 MMHG | SYSTOLIC BLOOD PRESSURE: 151 MMHG | TEMPERATURE: 98 F

## 2020-10-31 DIAGNOSIS — M00.9 SEPTIC ARTHRITIS OF AC JOINT (H): Primary | ICD-10-CM

## 2020-10-31 PROCEDURE — 99207 PR NO CHARGE LOS: CPT

## 2020-10-31 PROCEDURE — 250N000011 HC RX IP 250 OP 636: Performed by: INTERNAL MEDICINE

## 2020-10-31 PROCEDURE — 258N000003 HC RX IP 258 OP 636: Performed by: INTERNAL MEDICINE

## 2020-10-31 PROCEDURE — 96365 THER/PROPH/DIAG IV INF INIT: CPT

## 2020-10-31 RX ORDER — HEPARIN SODIUM,PORCINE 10 UNIT/ML
5 VIAL (ML) INTRAVENOUS
Status: CANCELLED | OUTPATIENT
Start: 2020-11-01

## 2020-10-31 RX ORDER — HEPARIN SODIUM (PORCINE) LOCK FLUSH IV SOLN 100 UNIT/ML 100 UNIT/ML
5 SOLUTION INTRAVENOUS
Status: CANCELLED | OUTPATIENT
Start: 2020-11-01

## 2020-10-31 RX ADMIN — CEFTRIAXONE SODIUM 2 G: 2 INJECTION, POWDER, FOR SOLUTION INTRAMUSCULAR; INTRAVENOUS at 11:52

## 2020-10-31 NOTE — PROGRESS NOTES
Nursing Note  Ama Romna [facility] for:   Chief Complaint   Patient presents with     Infusion     During [facility]'s [facility] appointment, orders from Dr. Velez were completed.  Frequency: daily until 11/22/20. [facility] on weekends and weekdays at [facility].    Progress note:  Patient identification verified by name and date of birth.  Assessment completed.  Vitals recorded in Doc Flowsheets.  Patient was provided with education regarding medication/procedure and possible side effects.  Patient verbalized understanding.     present during visit today: Not Applicable.    Treatment Conditions: Non-applicable.    Premedications: were not ordered.    Drug Waste Record: No    Infusion length and rate:  Rocephin given over approximately 30 minutes.    Labs: were not ordered for this appointment.    Vascular access: peripheral IV placed today.    Post Infusion Assessment:  Patient tolerated infusion without incident.  No evidence of extravasations.  Access discontinued per protocol.     Discharge Plan:   Follow up plan of care with: ongoing infusions at [facility] on weekends and weekdays at [facility].  Discharge instructions were reviewed with patient.  Patient/representative verbalized understanding of discharge instructions and all questions answered.  Patient discharged from [facility] in stable condition.    Heydi Ellis RN    Administrations This Visit     cefTRIAXone (ROCEPHIN) 2 g in sodium chloride 0.9 % 100 mL intermittent infusion     Admin Date  10/31/2020 Action  New Bag Dose  2 g Rate  260 mL/hr Route  Intravenous Administered By  Heydi Ellis RN                BP (!) 151/90   Pulse 90   Temp 98  F (36.7  C) (Oral)   Resp 16   SpO2 97%

## 2020-10-31 NOTE — LETTER
10/31/2020         RE: Ama Roman  [address]  [address]  [place] [address]        Dear Colleague,    Thank you for referring your patient, Ama Roman, to the [facility]. Please see a copy of my visit note below.    Nursing Note  Ama Roman [facility] for:   Chief Complaint   Patient presents with     Infusion     During [facility]'s [facility] appointment, orders from Dr. Velez were completed.  Frequency: daily until 11/22/20. [facility] on weekends and weekdays at [facility].    Progress note:  Patient identification verified by name and date of birth.  Assessment completed.  Vitals recorded in Doc Flowsheets.  Patient was provided with education regarding medication/procedure and possible side effects.  Patient verbalized understanding.     present during visit today: Not Applicable.    Treatment Conditions: Non-applicable.    Premedications: were not ordered.    Drug Waste Record: No    Infusion length and rate:  Rocephin given over approximately 30 minutes.    Labs: were not ordered for this appointment.    Vascular access: peripheral IV placed today.    Post Infusion Assessment:  Patient tolerated infusion without incident.  No evidence of extravasations.  Access discontinued per protocol.     Discharge Plan:   Follow up plan of care with: ongoing infusions at [facility] on weekends and weekdays at [facility].  Discharge instructions were reviewed with patient.  Patient/representative verbalized understanding of discharge instructions and all questions answered.  Patient discharged from [facility] in stable condition.    Heydi Ellis RN    Administrations This Visit     cefTRIAXone (ROCEPHIN) 2 g in sodium chloride 0.9 % 100 mL intermittent infusion     Admin  Date  10/31/2020 Action  New Bag Dose  2 g Rate  260 mL/hr Route  Intravenous Administered By  Heydi Ellis RN                BP (!) 151/90   Pulse 90   Temp 98  F (36.7  C) (Oral)   Resp 16   SpO2 97%           Again, thank you for allowing me to participate in the care of your patient.        Sincerely,        [facility]

## 2020-11-01 ENCOUNTER — COMMUNICATION - HEALTHEAST (OUTPATIENT)
Dept: SCHEDULING | Facility: CLINIC | Age: 35
End: 2020-11-01

## 2020-11-01 ENCOUNTER — NURSE TRIAGE (OUTPATIENT)
Dept: NURSING | Facility: CLINIC | Age: 35
End: 2020-11-01

## 2020-11-01 NOTE — TELEPHONE ENCOUNTER
Blood/joint infection 2 weeks ago, also had surgery at [facility] at that time    She has been having antibiotic infusions at the [facility] and at [facility] since then.   -Has 2 infusions left     Was scheduled for infusion this morning at the [facility] but her son sprained his ankle, and she had to take him to the ED. As a result, she missed her appointment for her infusion today.    Feels fine, CBC count was normal last they checked. But she was told not to miss an infusion as her infection could come back. She is wondering what she should do?  -Go to ED for infusion?  -Skip a day?   -Take oral abx?    Advised patient to call infusion center first thing in the morning. Patient states she will just talk to them when she gets there at 9am.       Reason for Disposition    [1] Caller requesting NON-URGENT health information AND [2] PCP's office is the best resource    Protocols used: INFORMATION ONLY CALL-A-    Hina Soliman RN on 11/1/2020 at 12:07 PM

## 2020-11-02 ENCOUNTER — OFFICE VISIT - HEALTHEAST (OUTPATIENT)
Dept: ADDICTION MEDICINE | Facility: HOSPITAL | Age: 35
End: 2020-11-02

## 2020-11-02 ENCOUNTER — INFUSION - HEALTHEAST (OUTPATIENT)
Dept: INFUSION THERAPY | Facility: CLINIC | Age: 35
End: 2020-11-02

## 2020-11-02 DIAGNOSIS — M00.9 SEPTIC ARTHRITIS OF AC JOINT (H): ICD-10-CM

## 2020-11-02 DIAGNOSIS — F15.20 AMPHETAMINE USE DISORDER, SEVERE (H): ICD-10-CM

## 2020-11-03 ENCOUNTER — INFUSION - HEALTHEAST (OUTPATIENT)
Dept: INFUSION THERAPY | Facility: CLINIC | Age: 35
End: 2020-11-03

## 2020-11-03 DIAGNOSIS — M00.9 SEPTIC ARTHRITIS OF AC JOINT (H): ICD-10-CM

## 2020-11-04 ENCOUNTER — AMBULATORY - HEALTHEAST (OUTPATIENT)
Dept: ADDICTION MEDICINE | Facility: HOSPITAL | Age: 35
End: 2020-11-04

## 2020-11-04 ENCOUNTER — OFFICE VISIT - HEALTHEAST (OUTPATIENT)
Dept: ADDICTION MEDICINE | Facility: HOSPITAL | Age: 35
End: 2020-11-04

## 2020-11-04 ENCOUNTER — INFUSION - HEALTHEAST (OUTPATIENT)
Dept: INFUSION THERAPY | Facility: CLINIC | Age: 35
End: 2020-11-04

## 2020-11-04 DIAGNOSIS — M00.9 SEPTIC ARTHRITIS OF AC JOINT (H): ICD-10-CM

## 2020-11-04 DIAGNOSIS — F15.20 AMPHETAMINE USE DISORDER, SEVERE (H): ICD-10-CM

## 2020-11-04 LAB
BASOPHILS # BLD AUTO: 0.1 THOU/UL (ref 0–0.2)
BASOPHILS NFR BLD AUTO: 1 % (ref 0–2)
C REACTIVE PROTEIN LHE: 0.5 MG/DL (ref 0–0.8)
CREAT SERPL-MCNC: 0.71 MG/DL (ref 0.6–1.1)
EOSINOPHIL # BLD AUTO: 0.2 THOU/UL (ref 0–0.4)
EOSINOPHIL NFR BLD AUTO: 3 % (ref 0–6)
ERYTHROCYTE [DISTWIDTH] IN BLOOD BY AUTOMATED COUNT: 13.7 % (ref 11–14.5)
GFR SERPL CREATININE-BSD FRML MDRD: >60 ML/MIN/1.73M2
HCT VFR BLD AUTO: 32.1 % (ref 35–47)
HGB BLD-MCNC: 10.4 G/DL (ref 12–16)
IMM GRANULOCYTES # BLD: 0 THOU/UL
IMM GRANULOCYTES NFR BLD: 0 %
LYMPHOCYTES # BLD AUTO: 2.3 THOU/UL (ref 0.8–4.4)
LYMPHOCYTES NFR BLD AUTO: 39 % (ref 20–40)
MCH RBC QN AUTO: 25.6 PG (ref 27–34)
MCHC RBC AUTO-ENTMCNC: 32.4 G/DL (ref 32–36)
MCV RBC AUTO: 79 FL (ref 80–100)
MONOCYTES # BLD AUTO: 0.4 THOU/UL (ref 0–0.9)
MONOCYTES NFR BLD AUTO: 7 % (ref 2–10)
NEUTROPHILS # BLD AUTO: 3 THOU/UL (ref 2–7.7)
NEUTROPHILS NFR BLD AUTO: 51 % (ref 50–70)
PLATELET # BLD AUTO: 461 THOU/UL (ref 140–440)
PMV BLD AUTO: 8.9 FL (ref 8.5–12.5)
RBC # BLD AUTO: 4.07 MILL/UL (ref 3.8–5.4)
WBC: 5.9 THOU/UL (ref 4–11)

## 2020-11-05 ENCOUNTER — AMBULATORY - HEALTHEAST (OUTPATIENT)
Dept: ADDICTION MEDICINE | Facility: HOSPITAL | Age: 35
End: 2020-11-05

## 2020-11-05 ENCOUNTER — OFFICE VISIT - HEALTHEAST (OUTPATIENT)
Dept: ADDICTION MEDICINE | Facility: HOSPITAL | Age: 35
End: 2020-11-05

## 2020-11-05 ENCOUNTER — INFUSION - HEALTHEAST (OUTPATIENT)
Dept: INFUSION THERAPY | Facility: CLINIC | Age: 35
End: 2020-11-05

## 2020-11-05 DIAGNOSIS — M00.9 SEPTIC ARTHRITIS OF AC JOINT (H): ICD-10-CM

## 2020-11-05 DIAGNOSIS — F15.20 AMPHETAMINE USE DISORDER, SEVERE (H): ICD-10-CM

## 2020-11-06 ENCOUNTER — INFUSION - HEALTHEAST (OUTPATIENT)
Dept: INFUSION THERAPY | Facility: CLINIC | Age: 35
End: 2020-11-06

## 2020-11-06 ENCOUNTER — AMBULATORY - HEALTHEAST (OUTPATIENT)
Dept: ADDICTION MEDICINE | Facility: HOSPITAL | Age: 35
End: 2020-11-06

## 2020-11-06 DIAGNOSIS — M00.9 SEPTIC ARTHRITIS OF AC JOINT (H): ICD-10-CM

## 2020-11-07 ENCOUNTER — INFUSION THERAPY VISIT (OUTPATIENT)
Dept: INFUSION THERAPY | Facility: CLINIC | Age: 35
End: 2020-11-07
Attending: INTERNAL MEDICINE
Payer: MEDICAID

## 2020-11-07 VITALS
SYSTOLIC BLOOD PRESSURE: 142 MMHG | DIASTOLIC BLOOD PRESSURE: 86 MMHG | RESPIRATION RATE: 16 BRPM | HEART RATE: 81 BPM | TEMPERATURE: 98 F | OXYGEN SATURATION: 98 %

## 2020-11-07 DIAGNOSIS — M00.9 SEPTIC ARTHRITIS OF AC JOINT (H): Primary | ICD-10-CM

## 2020-11-07 PROCEDURE — 96365 THER/PROPH/DIAG IV INF INIT: CPT

## 2020-11-07 PROCEDURE — 99207 PR NO CHARGE LOS: CPT

## 2020-11-07 PROCEDURE — 258N000003 HC RX IP 258 OP 636: Performed by: INTERNAL MEDICINE

## 2020-11-07 PROCEDURE — 250N000011 HC RX IP 250 OP 636: Performed by: INTERNAL MEDICINE

## 2020-11-07 RX ORDER — HEPARIN SODIUM,PORCINE 10 UNIT/ML
5 VIAL (ML) INTRAVENOUS
Status: CANCELLED | OUTPATIENT
Start: 2020-11-08

## 2020-11-07 RX ORDER — HEPARIN SODIUM (PORCINE) LOCK FLUSH IV SOLN 100 UNIT/ML 100 UNIT/ML
5 SOLUTION INTRAVENOUS
Status: CANCELLED | OUTPATIENT
Start: 2020-11-08

## 2020-11-07 RX ADMIN — CEFTRIAXONE SODIUM 2 G: 2 INJECTION, POWDER, FOR SOLUTION INTRAMUSCULAR; INTRAVENOUS at 11:43

## 2020-11-07 NOTE — LETTER
11/7/2020         RE: Ama Roman  [address]  [address]  [place] [address]        Dear Colleague,    Thank you for referring your patient, Ama Roman, to the [facility]. Please see a copy of my visit note below.    Nursing Note  Ama Roman presents today to [facility] for:   Chief Complaint   Patient presents with     Infusion     IV Rocephin     During today's [facility] appointment, orders from Dr FRANNIE Feldman were completed.  Frequency: daily    Progress note:  Patient identification verified by name and date of birth.  Assessment completed.  Vitals recorded in Doc Flowsheets.  Patient was provided with education regarding medication/procedure and possible side effects.  Patient verbalized understanding.     present during visit today: Not Applicable.    Treatment Conditions: Non-applicable.    Premedications: were not ordered.    Drug Waste Record: No    Infusion length and rate:  infusion given over approximately 30 minutes    Labs: were not ordered for this appointment.    Vascular access: peripheral IV placed today.    Post Infusion Assessment:  Patient tolerated infusion without incident.  Blood return noted pre and post infusion.  Site patent and intact, free from redness, edema or discomfort.  No evidence of extravasations.  Access discontinued per protocol.     Discharge Plan:   Follow up plan of care with: ongoing infusions at [facility].  Discharge instructions were reviewed with patient.  Patient/representative verbalized understanding of discharge instructions and all questions answered.  Patient discharged from [facility] in stable condition.  Patient declined AVS.    Daniel Thomas RN    Administrations This Visit     cefTRIAXone (ROCEPHIN) 2 g in sodium chloride 0.9 % 100 mL intermittent infusion      Admin Date  11/07/2020 Action  New Bag Dose  2 g Rate  200 mL/hr Route  Intravenous Administered By  Daniel Thomas RN                BP (!) 141/84   Pulse 91   Temp 98  F (36.7  C) (Oral)   Resp 16   SpO2 98%           Again, thank you for allowing me to participate in the care of your patient.        Sincerely,        [facility]

## 2020-11-07 NOTE — PROGRESS NOTES
Nursing Note  Ama Roman [facility] for:   Chief Complaint   Patient presents with     Infusion     IV Rocephin     During [facility]'s [facility] appointment, orders from Dr FRANNIE Feldman were completed.  Frequency: daily    Progress note:  Patient identification verified by name and date of birth.  Assessment completed.  Vitals recorded in Doc Flowsheets.  Patient was provided with education regarding medication/procedure and possible side effects.  Patient verbalized understanding.     present during visit today: Not Applicable.    Treatment Conditions: Non-applicable.    Premedications: were not ordered.    Drug Waste Record: No    Infusion length and rate:  infusion given over approximately 30 minutes    Labs: were not ordered for this appointment.    Vascular access: peripheral IV placed today.    Post Infusion Assessment:  Patient tolerated infusion without incident.  Blood return noted pre and post infusion.  Site patent and intact, free from redness, edema or discomfort.  No evidence of extravasations.  Access discontinued per protocol.     Discharge Plan:   Follow up plan of care with: ongoing infusions at [facility].  Discharge instructions were reviewed with patient.  Patient/representative verbalized understanding of discharge instructions and all questions answered.  Patient discharged from [facility] in stable condition.  Patient declined AVS.    Daniel Thomas RN    Administrations This Visit     cefTRIAXone (ROCEPHIN) 2 g in sodium chloride 0.9 % 100 mL intermittent infusion     Admin Date  11/07/2020 Action  New Bag Dose  2 g Rate  200 mL/hr Route  Intravenous Administered By  Daniel Thomas RN                BP (!) 141/84   Pulse 91   Temp 98  F (36.7  C) (Oral)   Resp 16   SpO2 98%

## 2020-11-08 ENCOUNTER — INFUSION THERAPY VISIT (OUTPATIENT)
Dept: INFUSION THERAPY | Facility: CLINIC | Age: 35
End: 2020-11-08
Attending: INTERNAL MEDICINE
Payer: MEDICAID

## 2020-11-08 VITALS
DIASTOLIC BLOOD PRESSURE: 82 MMHG | TEMPERATURE: 98 F | HEART RATE: 86 BPM | SYSTOLIC BLOOD PRESSURE: 128 MMHG | RESPIRATION RATE: 16 BRPM

## 2020-11-08 DIAGNOSIS — M00.9 SEPTIC ARTHRITIS OF AC JOINT (H): Primary | ICD-10-CM

## 2020-11-08 PROCEDURE — 258N000003 HC RX IP 258 OP 636: Performed by: INTERNAL MEDICINE

## 2020-11-08 PROCEDURE — 96365 THER/PROPH/DIAG IV INF INIT: CPT

## 2020-11-08 PROCEDURE — 99207 PR NO CHARGE LOS: CPT

## 2020-11-08 PROCEDURE — 250N000011 HC RX IP 250 OP 636: Performed by: INTERNAL MEDICINE

## 2020-11-08 RX ORDER — HEPARIN SODIUM (PORCINE) LOCK FLUSH IV SOLN 100 UNIT/ML 100 UNIT/ML
5 SOLUTION INTRAVENOUS
Status: CANCELLED | OUTPATIENT
Start: 2020-11-14

## 2020-11-08 RX ORDER — HEPARIN SODIUM,PORCINE 10 UNIT/ML
5 VIAL (ML) INTRAVENOUS
Status: CANCELLED | OUTPATIENT
Start: 2020-11-14

## 2020-11-08 RX ADMIN — CEFTRIAXONE SODIUM 2 G: 2 INJECTION, POWDER, FOR SOLUTION INTRAMUSCULAR; INTRAVENOUS at 11:16

## 2020-11-08 NOTE — PROGRESS NOTES
Nursing Note  Ama Roman [facility] for:   Chief Complaint   Patient presents with     Infusion     cefTRIAXone (ROCEPHIN)      During [facility] appointment, orders from Dr FRANNIE Feldman were completed.  Frequency: daily    Progress note:  Patient identification verified by name and date of birth.  Assessment completed.  Vitals recorded in Doc Flowsheets.  Patient was provided with education regarding medication/procedure and possible side effects.  Patient verbalized understanding.     present during visit today: Not Applicable.    Treatment Conditions: Non-applicable.    Premedications: were not ordered.    Drug Waste Record: No    Infusion length and rate:  infusion given over approximately 30 minutes    Labs: were not ordered for this appointment.    Vascular access: peripheral IV placed today.    Post Infusion Assessment:  Patient tolerated infusion without incident.  Blood return noted pre and post infusion.  Site patent and intact, free from redness, edema or discomfort.  No evidence of extravasations.  Access discontinued per protocol.     Discharge Plan:   Follow up plan of care with: ongoing [facility].  Discharge instructions were reviewed with patient.  Patient/representative verbalized understanding of discharge instructions and all questions answered.  [facility] in stable condition.  Patient declined AVS.    Heydi Ellis RN    Administrations This Visit     cefTRIAXone (ROCEPHIN) 2 g in sodium chloride 0.9 % 100 mL intermittent infusion     Admin Date  11/08/2020 Action  New Bag Dose  2 g Rate  200 mL/hr Route  Intravenous Administered By  Heydi Ellis RN                /82   Pulse 86   Temp 98  F (36.7  C) (Oral)   Resp 16

## 2020-11-08 NOTE — LETTER
11/8/2020         RE: Ama Roman  [address]  [address]  [place] [address]        Dear Colleague,    Thank you for referring your patient, Ama Roman, to the [facility]. Please see a copy of my visit note below.    Nursing Note  Ama Roman [facility] for:   Chief Complaint   Patient presents with     Infusion     cefTRIAXone (ROCEPHIN)      During [facility]'s [facility] appointment, orders from Dr FRANNIE Feldman were completed.  Frequency: daily    Progress note:  Patient identification verified by name and date of birth.  Assessment completed.  Vitals recorded in Doc Flowsheets.  Patient was provided with education regarding medication/procedure and possible side effects.  Patient verbalized understanding.     present during visit today: Not Applicable.    Treatment Conditions: Non-applicable.    Premedications: were not ordered.    Drug Waste Record: No    Infusion length and rate:  infusion given over approximately 30 minutes    Labs: were not ordered for this appointment.    Vascular access: peripheral IV placed today.    Post Infusion Assessment:  Patient tolerated infusion without incident.  Blood return noted pre and post infusion.  Site patent and intact, free from redness, edema or discomfort.  No evidence of extravasations.  Access discontinued per protocol.     Discharge Plan:   Follow up plan of care with: ongoing infusions at [facility].  Discharge instructions were reviewed with patient.  Patient/representative verbalized understanding of discharge instructions and all questions answered.  Patient discharged from [facility] in stable condition.  Patient declined AVS.    Heydi Ellis RN    Administrations This Visit     cefTRIAXone (ROCEPHIN) 2 g in sodium chloride 0.9 % 100 mL intermittent  infusion     Admin Date  11/08/2020 Action  New Bag Dose  2 g Rate  200 mL/hr Route  Intravenous Administered By  Heydi Ellis RN                /82   Pulse 86   Temp 98  F (36.7  C) (Oral)   Resp 16           Again, thank you for allowing me to participate in the care of your patient.        Sincerely,        [facility]

## 2020-11-09 ENCOUNTER — INFUSION - HEALTHEAST (OUTPATIENT)
Dept: INFUSION THERAPY | Facility: CLINIC | Age: 35
End: 2020-11-09

## 2020-11-09 ENCOUNTER — OFFICE VISIT - HEALTHEAST (OUTPATIENT)
Dept: ADDICTION MEDICINE | Facility: HOSPITAL | Age: 35
End: 2020-11-09

## 2020-11-09 DIAGNOSIS — M00.9 SEPTIC ARTHRITIS OF AC JOINT (H): ICD-10-CM

## 2020-11-09 DIAGNOSIS — F15.20 AMPHETAMINE USE DISORDER, SEVERE (H): ICD-10-CM

## 2020-11-10 ENCOUNTER — INFUSION - HEALTHEAST (OUTPATIENT)
Dept: INFUSION THERAPY | Facility: CLINIC | Age: 35
End: 2020-11-10

## 2020-11-10 ENCOUNTER — AMBULATORY - HEALTHEAST (OUTPATIENT)
Dept: ADDICTION MEDICINE | Facility: HOSPITAL | Age: 35
End: 2020-11-10

## 2020-11-10 DIAGNOSIS — M00.9 SEPTIC ARTHRITIS OF AC JOINT (H): ICD-10-CM

## 2020-11-11 ENCOUNTER — OFFICE VISIT - HEALTHEAST (OUTPATIENT)
Dept: ADDICTION MEDICINE | Facility: HOSPITAL | Age: 35
End: 2020-11-11

## 2020-11-11 ENCOUNTER — INFUSION - HEALTHEAST (OUTPATIENT)
Dept: INFUSION THERAPY | Facility: CLINIC | Age: 35
End: 2020-11-11

## 2020-11-11 DIAGNOSIS — F15.20 AMPHETAMINE USE DISORDER, SEVERE (H): ICD-10-CM

## 2020-11-11 DIAGNOSIS — M00.9 SEPTIC ARTHRITIS OF AC JOINT (H): ICD-10-CM

## 2020-11-11 LAB
BASOPHILS # BLD AUTO: 0.1 THOU/UL (ref 0–0.2)
BASOPHILS NFR BLD AUTO: 1 % (ref 0–2)
C REACTIVE PROTEIN LHE: 0.7 MG/DL (ref 0–0.8)
CREAT SERPL-MCNC: 0.72 MG/DL (ref 0.6–1.1)
EOSINOPHIL # BLD AUTO: 0.3 THOU/UL (ref 0–0.4)
EOSINOPHIL NFR BLD AUTO: 4 % (ref 0–6)
ERYTHROCYTE [DISTWIDTH] IN BLOOD BY AUTOMATED COUNT: 14 % (ref 11–14.5)
GFR SERPL CREATININE-BSD FRML MDRD: >60 ML/MIN/1.73M2
HCT VFR BLD AUTO: 33.5 % (ref 35–47)
HGB BLD-MCNC: 10.8 G/DL (ref 12–16)
IMM GRANULOCYTES # BLD: 0 THOU/UL
IMM GRANULOCYTES NFR BLD: 1 %
LYMPHOCYTES # BLD AUTO: 2.2 THOU/UL (ref 0.8–4.4)
LYMPHOCYTES NFR BLD AUTO: 30 % (ref 20–40)
MCH RBC QN AUTO: 25.2 PG (ref 27–34)
MCHC RBC AUTO-ENTMCNC: 32.2 G/DL (ref 32–36)
MCV RBC AUTO: 78 FL (ref 80–100)
MONOCYTES # BLD AUTO: 0.5 THOU/UL (ref 0–0.9)
MONOCYTES NFR BLD AUTO: 7 % (ref 2–10)
NEUTROPHILS # BLD AUTO: 4.3 THOU/UL (ref 2–7.7)
NEUTROPHILS NFR BLD AUTO: 58 % (ref 50–70)
PLATELET # BLD AUTO: 351 THOU/UL (ref 140–440)
PMV BLD AUTO: 9.1 FL (ref 8.5–12.5)
RBC # BLD AUTO: 4.28 MILL/UL (ref 3.8–5.4)
WBC: 7.4 THOU/UL (ref 4–11)

## 2020-11-12 ENCOUNTER — INFUSION - HEALTHEAST (OUTPATIENT)
Dept: INFUSION THERAPY | Facility: CLINIC | Age: 35
End: 2020-11-12

## 2020-11-12 ENCOUNTER — OFFICE VISIT - HEALTHEAST (OUTPATIENT)
Dept: ADDICTION MEDICINE | Facility: HOSPITAL | Age: 35
End: 2020-11-12

## 2020-11-12 DIAGNOSIS — M00.9 SEPTIC ARTHRITIS OF AC JOINT (H): ICD-10-CM

## 2020-11-12 DIAGNOSIS — F15.20 AMPHETAMINE USE DISORDER, SEVERE (H): ICD-10-CM

## 2020-11-13 ENCOUNTER — COMMUNICATION - HEALTHEAST (OUTPATIENT)
Dept: INFECTIOUS DISEASES | Facility: CLINIC | Age: 35
End: 2020-11-13

## 2020-11-13 DIAGNOSIS — M00.9 PYOGENIC ARTHRITIS OF LEFT SHOULDER REGION, DUE TO UNSPECIFIED ORGANISM (H): ICD-10-CM

## 2020-11-16 ENCOUNTER — OFFICE VISIT - HEALTHEAST (OUTPATIENT)
Dept: ADDICTION MEDICINE | Facility: HOSPITAL | Age: 35
End: 2020-11-16

## 2020-11-16 DIAGNOSIS — F15.20 AMPHETAMINE USE DISORDER, SEVERE (H): ICD-10-CM

## 2020-11-17 ENCOUNTER — AMBULATORY - HEALTHEAST (OUTPATIENT)
Dept: ADDICTION MEDICINE | Facility: HOSPITAL | Age: 35
End: 2020-11-17

## 2020-11-19 ENCOUNTER — OFFICE VISIT - HEALTHEAST (OUTPATIENT)
Dept: ADDICTION MEDICINE | Facility: HOSPITAL | Age: 35
End: 2020-11-19

## 2020-11-19 DIAGNOSIS — F15.20 AMPHETAMINE USE DISORDER, SEVERE (H): ICD-10-CM

## 2020-11-23 ENCOUNTER — OFFICE VISIT - HEALTHEAST (OUTPATIENT)
Dept: ADDICTION MEDICINE | Facility: HOSPITAL | Age: 35
End: 2020-11-23

## 2020-11-23 ENCOUNTER — COMMUNICATION - HEALTHEAST (OUTPATIENT)
Dept: ADDICTION MEDICINE | Facility: HOSPITAL | Age: 35
End: 2020-11-23

## 2020-11-23 DIAGNOSIS — F15.20 AMPHETAMINE USE DISORDER, SEVERE (H): ICD-10-CM

## 2020-11-27 ENCOUNTER — AMBULATORY - HEALTHEAST (OUTPATIENT)
Dept: ADDICTION MEDICINE | Facility: HOSPITAL | Age: 35
End: 2020-11-27

## 2020-11-30 ENCOUNTER — OFFICE VISIT - HEALTHEAST (OUTPATIENT)
Dept: ADDICTION MEDICINE | Facility: HOSPITAL | Age: 35
End: 2020-11-30

## 2020-11-30 DIAGNOSIS — F15.20 AMPHETAMINE USE DISORDER, SEVERE (H): ICD-10-CM

## 2020-12-01 ENCOUNTER — COMMUNICATION - HEALTHEAST (OUTPATIENT)
Dept: ADDICTION MEDICINE | Facility: HOSPITAL | Age: 35
End: 2020-12-01

## 2020-12-01 ENCOUNTER — AMBULATORY - HEALTHEAST (OUTPATIENT)
Dept: ADDICTION MEDICINE | Facility: HOSPITAL | Age: 35
End: 2020-12-01

## 2020-12-02 ENCOUNTER — AMBULATORY - HEALTHEAST (OUTPATIENT)
Dept: ADDICTION MEDICINE | Facility: HOSPITAL | Age: 35
End: 2020-12-02

## 2020-12-03 ENCOUNTER — OFFICE VISIT - HEALTHEAST (OUTPATIENT)
Dept: ADDICTION MEDICINE | Facility: HOSPITAL | Age: 35
End: 2020-12-03

## 2020-12-03 DIAGNOSIS — F15.20 AMPHETAMINE USE DISORDER, SEVERE (H): ICD-10-CM

## 2020-12-14 ENCOUNTER — COMMUNICATION - HEALTHEAST (OUTPATIENT)
Dept: ADDICTION MEDICINE | Facility: HOSPITAL | Age: 35
End: 2020-12-14

## 2020-12-17 ENCOUNTER — OFFICE VISIT - HEALTHEAST (OUTPATIENT)
Dept: ADDICTION MEDICINE | Facility: HOSPITAL | Age: 35
End: 2020-12-17

## 2020-12-17 ENCOUNTER — COMMUNICATION - HEALTHEAST (OUTPATIENT)
Dept: SCHEDULING | Facility: CLINIC | Age: 35
End: 2020-12-17

## 2020-12-17 DIAGNOSIS — F15.20 AMPHETAMINE USE DISORDER, SEVERE (H): ICD-10-CM

## 2020-12-18 ENCOUNTER — AMBULATORY - HEALTHEAST (OUTPATIENT)
Dept: ADDICTION MEDICINE | Facility: HOSPITAL | Age: 35
End: 2020-12-18

## 2020-12-21 ENCOUNTER — COMMUNICATION - HEALTHEAST (OUTPATIENT)
Dept: EMERGENCY MEDICINE | Facility: CLINIC | Age: 35
End: 2020-12-21

## 2020-12-21 ENCOUNTER — OFFICE VISIT - HEALTHEAST (OUTPATIENT)
Dept: ADDICTION MEDICINE | Facility: HOSPITAL | Age: 35
End: 2020-12-21

## 2020-12-21 ENCOUNTER — OFFICE VISIT - HEALTHEAST (OUTPATIENT)
Dept: FAMILY MEDICINE | Facility: CLINIC | Age: 35
End: 2020-12-21

## 2020-12-21 DIAGNOSIS — F15.20 AMPHETAMINE USE DISORDER, SEVERE (H): ICD-10-CM

## 2020-12-21 DIAGNOSIS — R79.82 ELEVATED C-REACTIVE PROTEIN (CRP): ICD-10-CM

## 2020-12-21 DIAGNOSIS — R70.0 ELEVATED ERYTHROCYTE SEDIMENTATION RATE: ICD-10-CM

## 2020-12-22 ENCOUNTER — AMBULATORY - HEALTHEAST (OUTPATIENT)
Dept: ADDICTION MEDICINE | Facility: HOSPITAL | Age: 35
End: 2020-12-22

## 2020-12-28 ENCOUNTER — COMMUNICATION - HEALTHEAST (OUTPATIENT)
Dept: ADDICTION MEDICINE | Facility: HOSPITAL | Age: 35
End: 2020-12-28

## 2020-12-28 ENCOUNTER — AMBULATORY - HEALTHEAST (OUTPATIENT)
Dept: LAB | Facility: HOSPITAL | Age: 35
End: 2020-12-28

## 2020-12-28 ENCOUNTER — OFFICE VISIT - HEALTHEAST (OUTPATIENT)
Dept: ADDICTION MEDICINE | Facility: HOSPITAL | Age: 35
End: 2020-12-28

## 2020-12-28 DIAGNOSIS — F15.20 AMPHETAMINE USE DISORDER, SEVERE (H): ICD-10-CM

## 2020-12-28 LAB
AMPHETAMINES UR QL SCN: NORMAL
BARBITURATES UR QL: NORMAL
BENZODIAZ UR QL: NORMAL
CANNABINOIDS UR QL SCN: NORMAL
COCAINE UR QL: NORMAL
CREAT UR-MCNC: 46.9 MG/DL
ETHANOL UR CFM-MCNC: <10 MG/DL
METHADONE UR QL SCN: NORMAL
OPIATES UR QL SCN: NORMAL
OXYCODONE UR QL: NORMAL
PCP UR QL SCN: NORMAL

## 2020-12-29 ENCOUNTER — AMBULATORY - HEALTHEAST (OUTPATIENT)
Dept: ADDICTION MEDICINE | Facility: HOSPITAL | Age: 35
End: 2020-12-29

## 2020-12-29 ENCOUNTER — COMMUNICATION - HEALTHEAST (OUTPATIENT)
Dept: ADDICTION MEDICINE | Facility: HOSPITAL | Age: 35
End: 2020-12-29

## 2020-12-30 ENCOUNTER — AMBULATORY - HEALTHEAST (OUTPATIENT)
Dept: ADDICTION MEDICINE | Facility: HOSPITAL | Age: 35
End: 2020-12-30

## 2020-12-31 ENCOUNTER — OFFICE VISIT - HEALTHEAST (OUTPATIENT)
Dept: ADDICTION MEDICINE | Facility: HOSPITAL | Age: 35
End: 2020-12-31

## 2020-12-31 DIAGNOSIS — F15.20 AMPHETAMINE USE DISORDER, SEVERE (H): ICD-10-CM

## 2021-01-02 ENCOUNTER — AMBULATORY - HEALTHEAST (OUTPATIENT)
Dept: LAB | Facility: HOSPITAL | Age: 36
End: 2021-01-02

## 2021-01-02 DIAGNOSIS — F15.20 AMPHETAMINE USE DISORDER, SEVERE (H): ICD-10-CM

## 2021-01-02 LAB
AMPHETAMINES UR QL SCN: NORMAL
BARBITURATES UR QL: NORMAL
BENZODIAZ UR QL: NORMAL
CANNABINOIDS UR QL SCN: NORMAL
COCAINE UR QL: NORMAL
CREAT UR-MCNC: 46.7 MG/DL
ETHANOL UR CFM-MCNC: <10 MG/DL
METHADONE UR QL SCN: NORMAL
OPIATES UR QL SCN: NORMAL
OXYCODONE UR QL: NORMAL
PCP UR QL SCN: NORMAL

## 2021-01-04 ENCOUNTER — OFFICE VISIT - HEALTHEAST (OUTPATIENT)
Dept: ADDICTION MEDICINE | Facility: HOSPITAL | Age: 36
End: 2021-01-04

## 2021-01-04 DIAGNOSIS — F15.20 AMPHETAMINE USE DISORDER, SEVERE (H): ICD-10-CM

## 2021-01-07 ENCOUNTER — AMBULATORY - HEALTHEAST (OUTPATIENT)
Dept: ADDICTION MEDICINE | Facility: HOSPITAL | Age: 36
End: 2021-01-07

## 2021-01-07 ENCOUNTER — OFFICE VISIT - HEALTHEAST (OUTPATIENT)
Dept: ADDICTION MEDICINE | Facility: HOSPITAL | Age: 36
End: 2021-01-07

## 2021-01-07 DIAGNOSIS — F15.20 AMPHETAMINE USE DISORDER, SEVERE (H): ICD-10-CM

## 2021-01-12 ENCOUNTER — AMBULATORY - HEALTHEAST (OUTPATIENT)
Dept: LAB | Facility: HOSPITAL | Age: 36
End: 2021-01-12

## 2021-01-12 DIAGNOSIS — F15.20 AMPHETAMINE USE DISORDER, SEVERE (H): ICD-10-CM

## 2021-01-12 LAB
AMPHETAMINES UR QL SCN: NORMAL
BARBITURATES UR QL: NORMAL
BENZODIAZ UR QL: NORMAL
CANNABINOIDS UR QL SCN: NORMAL
COCAINE UR QL: NORMAL
CREAT UR-MCNC: 45.6 MG/DL
ETHANOL UR CFM-MCNC: <10 MG/DL
METHADONE UR QL SCN: NORMAL
OPIATES UR QL SCN: NORMAL
OXYCODONE UR QL: NORMAL
PCP UR QL SCN: NORMAL

## 2021-01-13 ENCOUNTER — COMMUNICATION - HEALTHEAST (OUTPATIENT)
Dept: ADDICTION MEDICINE | Facility: HOSPITAL | Age: 36
End: 2021-01-13

## 2021-01-14 ENCOUNTER — AMBULATORY - HEALTHEAST (OUTPATIENT)
Dept: ADDICTION MEDICINE | Facility: HOSPITAL | Age: 36
End: 2021-01-14

## 2021-01-19 ENCOUNTER — OFFICE VISIT - HEALTHEAST (OUTPATIENT)
Dept: ADDICTION MEDICINE | Facility: HOSPITAL | Age: 36
End: 2021-01-19

## 2021-01-19 DIAGNOSIS — F15.20 AMPHETAMINE USE DISORDER, SEVERE (H): ICD-10-CM

## 2021-01-20 ENCOUNTER — AMBULATORY - HEALTHEAST (OUTPATIENT)
Dept: ADDICTION MEDICINE | Facility: HOSPITAL | Age: 36
End: 2021-01-20

## 2021-01-26 ENCOUNTER — OFFICE VISIT - HEALTHEAST (OUTPATIENT)
Dept: ADDICTION MEDICINE | Facility: HOSPITAL | Age: 36
End: 2021-01-26

## 2021-01-26 DIAGNOSIS — F15.20 AMPHETAMINE USE DISORDER, SEVERE (H): ICD-10-CM

## 2021-01-27 ENCOUNTER — AMBULATORY - HEALTHEAST (OUTPATIENT)
Dept: ADDICTION MEDICINE | Facility: HOSPITAL | Age: 36
End: 2021-01-27

## 2021-01-28 ENCOUNTER — AMBULATORY - HEALTHEAST (OUTPATIENT)
Dept: ADDICTION MEDICINE | Facility: HOSPITAL | Age: 36
End: 2021-01-28

## 2021-01-29 ENCOUNTER — AMBULATORY - HEALTHEAST (OUTPATIENT)
Dept: PHARMACY | Facility: HOSPITAL | Age: 36
End: 2021-01-29

## 2021-02-02 ENCOUNTER — OFFICE VISIT - HEALTHEAST (OUTPATIENT)
Dept: ADDICTION MEDICINE | Facility: HOSPITAL | Age: 36
End: 2021-02-02

## 2021-02-02 DIAGNOSIS — F15.20 AMPHETAMINE USE DISORDER, SEVERE (H): ICD-10-CM

## 2021-02-04 ENCOUNTER — COMMUNICATION - HEALTHEAST (OUTPATIENT)
Dept: ADDICTION MEDICINE | Facility: HOSPITAL | Age: 36
End: 2021-02-04

## 2021-02-04 ENCOUNTER — AMBULATORY - HEALTHEAST (OUTPATIENT)
Dept: ADDICTION MEDICINE | Facility: HOSPITAL | Age: 36
End: 2021-02-04

## 2021-02-09 ENCOUNTER — OFFICE VISIT - HEALTHEAST (OUTPATIENT)
Dept: ADDICTION MEDICINE | Facility: HOSPITAL | Age: 36
End: 2021-02-09

## 2021-02-09 DIAGNOSIS — F15.20 AMPHETAMINE USE DISORDER, SEVERE (H): ICD-10-CM

## 2021-02-10 ENCOUNTER — AMBULATORY - HEALTHEAST (OUTPATIENT)
Dept: ADDICTION MEDICINE | Facility: HOSPITAL | Age: 36
End: 2021-02-10

## 2021-02-11 ENCOUNTER — AMBULATORY - HEALTHEAST (OUTPATIENT)
Dept: ADDICTION MEDICINE | Facility: HOSPITAL | Age: 36
End: 2021-02-11

## 2021-02-18 ENCOUNTER — AMBULATORY - HEALTHEAST (OUTPATIENT)
Dept: ADDICTION MEDICINE | Facility: HOSPITAL | Age: 36
End: 2021-02-18

## 2021-02-22 ENCOUNTER — COMMUNICATION - HEALTHEAST (OUTPATIENT)
Dept: ADDICTION MEDICINE | Facility: HOSPITAL | Age: 36
End: 2021-02-22

## 2021-02-23 ENCOUNTER — COMMUNICATION - HEALTHEAST (OUTPATIENT)
Dept: ADDICTION MEDICINE | Facility: HOSPITAL | Age: 36
End: 2021-02-23

## 2021-02-23 ENCOUNTER — OFFICE VISIT - HEALTHEAST (OUTPATIENT)
Dept: ADDICTION MEDICINE | Facility: HOSPITAL | Age: 36
End: 2021-02-23

## 2021-02-23 DIAGNOSIS — F15.20 AMPHETAMINE USE DISORDER, SEVERE (H): ICD-10-CM

## 2021-02-24 ENCOUNTER — AMBULATORY - HEALTHEAST (OUTPATIENT)
Dept: ADDICTION MEDICINE | Facility: HOSPITAL | Age: 36
End: 2021-02-24

## 2021-03-01 ENCOUNTER — COMMUNICATION - HEALTHEAST (OUTPATIENT)
Dept: ADDICTION MEDICINE | Facility: HOSPITAL | Age: 36
End: 2021-03-01

## 2021-03-02 ENCOUNTER — OFFICE VISIT - HEALTHEAST (OUTPATIENT)
Dept: ADDICTION MEDICINE | Facility: HOSPITAL | Age: 36
End: 2021-03-02

## 2021-03-02 DIAGNOSIS — F15.20 AMPHETAMINE USE DISORDER, SEVERE (H): ICD-10-CM

## 2021-03-04 ENCOUNTER — AMBULATORY - HEALTHEAST (OUTPATIENT)
Dept: ADDICTION MEDICINE | Facility: HOSPITAL | Age: 36
End: 2021-03-04

## 2021-03-08 ENCOUNTER — COMMUNICATION - HEALTHEAST (OUTPATIENT)
Dept: ADDICTION MEDICINE | Facility: HOSPITAL | Age: 36
End: 2021-03-08

## 2021-03-09 ENCOUNTER — OFFICE VISIT - HEALTHEAST (OUTPATIENT)
Dept: ADDICTION MEDICINE | Facility: HOSPITAL | Age: 36
End: 2021-03-09

## 2021-03-09 DIAGNOSIS — F15.20 AMPHETAMINE USE DISORDER, SEVERE (H): ICD-10-CM

## 2021-03-10 ENCOUNTER — AMBULATORY - HEALTHEAST (OUTPATIENT)
Dept: ADDICTION MEDICINE | Facility: HOSPITAL | Age: 36
End: 2021-03-10

## 2021-03-16 ENCOUNTER — OFFICE VISIT - HEALTHEAST (OUTPATIENT)
Dept: ADDICTION MEDICINE | Facility: HOSPITAL | Age: 36
End: 2021-03-16

## 2021-03-16 DIAGNOSIS — F15.20 AMPHETAMINE USE DISORDER, SEVERE (H): ICD-10-CM

## 2021-03-19 ENCOUNTER — AMBULATORY - HEALTHEAST (OUTPATIENT)
Dept: ADDICTION MEDICINE | Facility: HOSPITAL | Age: 36
End: 2021-03-19

## 2021-03-23 ENCOUNTER — AMBULATORY - HEALTHEAST (OUTPATIENT)
Dept: ADDICTION MEDICINE | Facility: HOSPITAL | Age: 36
End: 2021-03-23

## 2021-03-26 ENCOUNTER — AMBULATORY - HEALTHEAST (OUTPATIENT)
Dept: ADDICTION MEDICINE | Facility: HOSPITAL | Age: 36
End: 2021-03-26

## 2021-03-30 ENCOUNTER — OFFICE VISIT - HEALTHEAST (OUTPATIENT)
Dept: ADDICTION MEDICINE | Facility: HOSPITAL | Age: 36
End: 2021-03-30

## 2021-03-30 DIAGNOSIS — F15.20 AMPHETAMINE USE DISORDER, SEVERE (H): ICD-10-CM

## 2021-04-02 ENCOUNTER — AMBULATORY - HEALTHEAST (OUTPATIENT)
Dept: ADDICTION MEDICINE | Facility: HOSPITAL | Age: 36
End: 2021-04-02

## 2021-04-06 ENCOUNTER — OFFICE VISIT - HEALTHEAST (OUTPATIENT)
Dept: ADDICTION MEDICINE | Facility: HOSPITAL | Age: 36
End: 2021-04-06

## 2021-04-06 DIAGNOSIS — F15.20 AMPHETAMINE USE DISORDER, SEVERE (H): ICD-10-CM

## 2021-04-07 ENCOUNTER — AMBULATORY - HEALTHEAST (OUTPATIENT)
Dept: ADDICTION MEDICINE | Facility: HOSPITAL | Age: 36
End: 2021-04-07

## 2021-04-08 ENCOUNTER — AMBULATORY - HEALTHEAST (OUTPATIENT)
Dept: ADDICTION MEDICINE | Facility: HOSPITAL | Age: 36
End: 2021-04-08

## 2021-04-13 ENCOUNTER — OFFICE VISIT - HEALTHEAST (OUTPATIENT)
Dept: ADDICTION MEDICINE | Facility: HOSPITAL | Age: 36
End: 2021-04-13

## 2021-04-13 DIAGNOSIS — F15.20 AMPHETAMINE USE DISORDER, SEVERE (H): ICD-10-CM

## 2021-04-14 ENCOUNTER — AMBULATORY - HEALTHEAST (OUTPATIENT)
Dept: ADDICTION MEDICINE | Facility: HOSPITAL | Age: 36
End: 2021-04-14

## 2021-04-20 ENCOUNTER — OFFICE VISIT - HEALTHEAST (OUTPATIENT)
Dept: ADDICTION MEDICINE | Facility: HOSPITAL | Age: 36
End: 2021-04-20

## 2021-04-20 DIAGNOSIS — F15.20 AMPHETAMINE USE DISORDER, SEVERE (H): ICD-10-CM

## 2021-04-22 ENCOUNTER — AMBULATORY - HEALTHEAST (OUTPATIENT)
Dept: ADDICTION MEDICINE | Facility: HOSPITAL | Age: 36
End: 2021-04-22

## 2021-04-27 ENCOUNTER — AMBULATORY - HEALTHEAST (OUTPATIENT)
Dept: ADDICTION MEDICINE | Facility: HOSPITAL | Age: 36
End: 2021-04-27

## 2021-04-28 ENCOUNTER — AMBULATORY - HEALTHEAST (OUTPATIENT)
Dept: ADDICTION MEDICINE | Facility: HOSPITAL | Age: 36
End: 2021-04-28

## 2021-04-28 ENCOUNTER — COMMUNICATION - HEALTHEAST (OUTPATIENT)
Dept: ADDICTION MEDICINE | Facility: HOSPITAL | Age: 36
End: 2021-04-28

## 2021-05-04 ENCOUNTER — AMBULATORY - HEALTHEAST (OUTPATIENT)
Dept: ADDICTION MEDICINE | Facility: HOSPITAL | Age: 36
End: 2021-05-04

## 2021-05-04 ENCOUNTER — COMMUNICATION - HEALTHEAST (OUTPATIENT)
Dept: ADDICTION MEDICINE | Facility: HOSPITAL | Age: 36
End: 2021-05-04

## 2021-05-05 ENCOUNTER — AMBULATORY - HEALTHEAST (OUTPATIENT)
Dept: ADDICTION MEDICINE | Facility: HOSPITAL | Age: 36
End: 2021-05-05

## 2021-05-05 ENCOUNTER — COMMUNICATION - HEALTHEAST (OUTPATIENT)
Dept: SCHEDULING | Facility: CLINIC | Age: 36
End: 2021-05-05

## 2021-05-11 ENCOUNTER — OFFICE VISIT - HEALTHEAST (OUTPATIENT)
Dept: ADDICTION MEDICINE | Facility: HOSPITAL | Age: 36
End: 2021-05-11

## 2021-05-11 DIAGNOSIS — F15.20 AMPHETAMINE USE DISORDER, SEVERE (H): ICD-10-CM

## 2021-05-14 ENCOUNTER — AMBULATORY - HEALTHEAST (OUTPATIENT)
Dept: ADDICTION MEDICINE | Facility: HOSPITAL | Age: 36
End: 2021-05-14

## 2021-05-26 VITALS
OXYGEN SATURATION: 98 % | DIASTOLIC BLOOD PRESSURE: 99 MMHG | SYSTOLIC BLOOD PRESSURE: 164 MMHG | HEART RATE: 94 BPM | TEMPERATURE: 97.7 F

## 2021-05-26 VITALS
HEART RATE: 79 BPM | OXYGEN SATURATION: 98 % | TEMPERATURE: 97.7 F | SYSTOLIC BLOOD PRESSURE: 141 MMHG | DIASTOLIC BLOOD PRESSURE: 83 MMHG

## 2021-05-26 VITALS
OXYGEN SATURATION: 100 % | HEART RATE: 88 BPM | SYSTOLIC BLOOD PRESSURE: 133 MMHG | TEMPERATURE: 97.9 F | DIASTOLIC BLOOD PRESSURE: 65 MMHG

## 2021-05-26 VITALS
HEART RATE: 82 BPM | TEMPERATURE: 97.7 F | SYSTOLIC BLOOD PRESSURE: 132 MMHG | RESPIRATION RATE: 16 BRPM | OXYGEN SATURATION: 98 % | DIASTOLIC BLOOD PRESSURE: 72 MMHG

## 2021-05-26 VITALS
SYSTOLIC BLOOD PRESSURE: 167 MMHG | TEMPERATURE: 98 F | HEART RATE: 86 BPM | DIASTOLIC BLOOD PRESSURE: 81 MMHG | OXYGEN SATURATION: 98 %

## 2021-05-26 VITALS
SYSTOLIC BLOOD PRESSURE: 129 MMHG | DIASTOLIC BLOOD PRESSURE: 75 MMHG | TEMPERATURE: 97.9 F | HEART RATE: 77 BPM | OXYGEN SATURATION: 99 %

## 2021-05-26 VITALS — DIASTOLIC BLOOD PRESSURE: 75 MMHG | HEART RATE: 78 BPM | SYSTOLIC BLOOD PRESSURE: 138 MMHG | TEMPERATURE: 98.5 F

## 2021-05-27 VITALS
OXYGEN SATURATION: 96 % | HEART RATE: 74 BPM | SYSTOLIC BLOOD PRESSURE: 144 MMHG | DIASTOLIC BLOOD PRESSURE: 78 MMHG | TEMPERATURE: 97.6 F

## 2021-05-27 VITALS
OXYGEN SATURATION: 99 % | TEMPERATURE: 98.3 F | DIASTOLIC BLOOD PRESSURE: 77 MMHG | SYSTOLIC BLOOD PRESSURE: 147 MMHG | HEART RATE: 85 BPM

## 2021-05-27 VITALS
SYSTOLIC BLOOD PRESSURE: 151 MMHG | OXYGEN SATURATION: 98 % | TEMPERATURE: 97.8 F | DIASTOLIC BLOOD PRESSURE: 75 MMHG | HEART RATE: 80 BPM

## 2021-05-27 VITALS
DIASTOLIC BLOOD PRESSURE: 86 MMHG | SYSTOLIC BLOOD PRESSURE: 140 MMHG | TEMPERATURE: 98.6 F | HEART RATE: 84 BPM | RESPIRATION RATE: 16 BRPM | OXYGEN SATURATION: 99 %

## 2021-05-27 VITALS
OXYGEN SATURATION: 98 % | SYSTOLIC BLOOD PRESSURE: 135 MMHG | TEMPERATURE: 97.5 F | HEART RATE: 94 BPM | DIASTOLIC BLOOD PRESSURE: 75 MMHG

## 2021-05-27 VITALS
HEART RATE: 95 BPM | TEMPERATURE: 97.3 F | DIASTOLIC BLOOD PRESSURE: 96 MMHG | SYSTOLIC BLOOD PRESSURE: 146 MMHG | OXYGEN SATURATION: 97 %

## 2021-05-27 VITALS
SYSTOLIC BLOOD PRESSURE: 167 MMHG | DIASTOLIC BLOOD PRESSURE: 82 MMHG | OXYGEN SATURATION: 99 % | HEART RATE: 84 BPM | TEMPERATURE: 97.6 F

## 2021-05-27 VITALS
HEART RATE: 77 BPM | DIASTOLIC BLOOD PRESSURE: 76 MMHG | OXYGEN SATURATION: 98 % | SYSTOLIC BLOOD PRESSURE: 132 MMHG | TEMPERATURE: 98 F

## 2021-05-27 VITALS
OXYGEN SATURATION: 96 % | DIASTOLIC BLOOD PRESSURE: 87 MMHG | HEART RATE: 85 BPM | SYSTOLIC BLOOD PRESSURE: 143 MMHG | TEMPERATURE: 97.7 F

## 2021-05-27 VITALS
TEMPERATURE: 98.1 F | SYSTOLIC BLOOD PRESSURE: 138 MMHG | HEART RATE: 69 BPM | OXYGEN SATURATION: 98 % | DIASTOLIC BLOOD PRESSURE: 87 MMHG

## 2021-06-05 VITALS — WEIGHT: 236.2 LBS | HEIGHT: 70 IN | BODY MASS INDEX: 33.82 KG/M2

## 2021-06-11 NOTE — TELEPHONE ENCOUNTER
Phone Call:    D) Call was made to Mohit Overton this date and message left re: notice of Pt. treatment program admit, and requesting call back.      Staff Name and Title: MONIQUE Levin  Date:  9/24/2020  Time:  4:45 PM

## 2021-06-11 NOTE — TELEPHONE ENCOUNTER
Phone Call:    D) Call was made to ALONSO Goss Rech this date and message left re: Notice of Pt.'s treatment intake/enrollment, and requesting call back.    Staff Name and Title: MONIQUE Levin  Date:  9/14/2020  Time:  5:30 PM

## 2021-06-11 NOTE — TELEPHONE ENCOUNTER
Phone Call:    D) Call was made to Pt. this date and message left re: attendance concern.     Staff Name and Title: MONIQUE Levin  Date:  9/30/2020  Time:  5:05 PM

## 2021-06-11 NOTE — TELEPHONE ENCOUNTER
Phone Call:    D) ALONSO Bronson called this date and left message re: Pt. Concerns and requesting a call back.   Call back was made this date, ALONSO Bronson provided information on Pt.'s history with CPS and probation including compliance issues and concern for continued use issues.  Counselor advised because of the concerns that CPS and probation continue with their own system UA testing schedule and treatment will randomly UA test in addition. Counselor also updated that Pt. Has yet to start treatment sessions since intake and may be discharged AWOL for that reason.      Staff Name and Title: MONIQUE Levin  Date:  9/15/2020  Time:  10:19 AM     done

## 2021-06-11 NOTE — TELEPHONE ENCOUNTER
Phone Call:    D) Call was made this date to Pt., re: attendance concerns.   Pt. Confirmed that she plans to start treatment group sessions this date 9/14/20.    Staff Name and Title: MONIQUE Levin  Date:  9/14/2020  Time:  11:57 AM

## 2021-06-11 NOTE — TELEPHONE ENCOUNTER
Phone Call:     D) ALONSO Bronson called this date and left message re: Pt. sobriety and treatment start concerns and requesting a call back.   Call back was made this date to ALONSO Bronson. Counselor confirmed Pt. starting and attending group on 9/16/20.      Staff Name and Title:   JOEY Levin, [facility]  Date: 9/17/2020  Time: 12:46 PM

## 2021-06-12 NOTE — TELEPHONE ENCOUNTER
She should have standing labs at infusion center, so no additional labs are necessary. I only ordered the other labs to be done when plan was oral antibiotics.     Fam Loaiza

## 2021-06-12 NOTE — TELEPHONE ENCOUNTER
Phone Call:    D) Pt. called in this date re: notice of hospitalization.  Pt. Reports she admitted to the hospital on 10/13/20 and due to a joint infection had a surgery procedure this date. Pt. Reports she is expecting to be discharged on 10/15/20, but not yet sure of the discharge plan. She reports the medical staff have some concern due to her history of IV drug use. Pt. Reports plans to contact counselor 10/15/20 to provide an update on her discharge status.       Staff Name and Title: MONIQUE Levin  Date:  10/14/2020  Time:  4:27 PM

## 2021-06-12 NOTE — TELEPHONE ENCOUNTER
Patient just got a new job and can  t go to  for infusions.  She would like to change to home infusions.

## 2021-06-12 NOTE — ANESTHESIA PREPROCEDURE EVALUATION
Anesthesia Evaluation      Patient summary reviewed   No history of anesthetic complications     Airway   Mallampati: II  Neck ROM: full   Pulmonary - negative ROS and normal exam                          Cardiovascular - negative ROS and normal exam   Neuro/Psych - negative ROS     Endo/Other - negative ROS      GI/Hepatic/Renal - negative ROS      Other findings: Meth abuse      Dental                         Anesthesia Plan  Planned anesthetic: general endotracheal    ASA 3 - emergent   Induction: intravenous   Anesthetic plan and risks discussed with: patient

## 2021-06-12 NOTE — PROGRESS NOTES
Three attempts at PIV today. Pt tolerated infusion well. Pt handed copy of labs, explained values and purpose of labs.

## 2021-06-12 NOTE — TELEPHONE ENCOUNTER
Pt calling stating that she decided not to take the job. She is wondering if she should stay on IV or start oral abx. I advised that she continue with IV abx until 11/24, as recommended. Pt understands and will call to schedule her infusions. RN at [facility] infusion updated.

## 2021-06-12 NOTE — TELEPHONE ENCOUNTER
Phone Call:    D) Call was made this date to Pt., re: response to voicemail received with new call phone number and request for treatment verification sent to [place] .    Pt. Provided counselor with verbal consent this date for an ARIEL for [place]  Brice Norton.     Staff Name and Title: MONIQUE Levin  Date:  10/19/2020  Time:  11:50 AM
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
## 2021-06-12 NOTE — TELEPHONE ENCOUNTER
IV is the standard so this is preferred. If it means oral antibiotic versus no antibiotic in that she is resigned to stopping IV, we can plan oral antibiotic. There is some experience with completing course with oral antibiotics.    Clindamycin 600mg PO four times a day to start on 11/3.     She should have labs checked in about 2 weeks. Will need lab appointment.     Fam Loaiza

## 2021-06-12 NOTE — ANESTHESIA CARE TRANSFER NOTE
Last vitals:   Vitals:    10/13/20 2230   BP: 113/65   Pulse: 93   Resp: 16   Temp: 36.7  C (98  F)   SpO2: 100%     Patient's level of consciousness is drowsy  Spontaneous respirations: yes  Maintains airway independently: yes  Dentition unchanged: yes  Oropharynx: oropharynx clear of all foreign objects    QCDR Measures:  ASA# 20 - Surgical Safety Checklist: WHO surgical safety checklist completed prior to induction    PQRS# 430 - Adult PONV Prevention: 4558F - Pt received => 2 anti-emetic agents (different classes) preop & intraop  ASA# 8 - Peds PONV Prevention: NA - Not pediatric patient, not GA or 2 or more risk factors NOT present  PQRS# 424 - Grazyna-op Temp Management: NA - MAC anesthesia or case < 60 minutes  PQRS# 426 - PACU Transfer Protocol: - Transfer of care checklist used  ASA# 14 - Acute Post-op Pain: ASA14B - Patient did NOT experience pain >= 7 out of 10

## 2021-06-12 NOTE — TELEPHONE ENCOUNTER
Phone Call:    D) Pt. called in this date re: request to be excused from group, reason being having neck and arm pain with plans to go have medically addressed. Pt. Reports plans to return to group on 10/14/20.      Staff Name and Title: MONIQUE Levin  Date:  10/12/2020  Time:  8:51 PM

## 2021-06-12 NOTE — PROGRESS NOTES
Pt arrives ambulatory to Infusion dept. IV started in right forearm. Pt reports no changes from Friday. Pt reports part of dressing from surgical site coming out near neck, and tape placed to re-secure. Pt tolerated ceftriaxone well. IV flushed, removed and covered. Pt left dept ambulatory.

## 2021-06-12 NOTE — PROGRESS NOTES
Pt states she has a shoulder joint and blood infection from doing IV drugs.  States they would not put in a PICC line because of her drug abuse. IV to be placed daily per pt.    D/C'd from hospital yesterday, Left arm in a sling.  No c/o's with infusion Her mother lives close to clinic to help her with transportation.

## 2021-06-12 NOTE — PROGRESS NOTES
Patient ambulated into Infusion Care by herself. Patient is alert and oriented and VSS. A peripheral IV was inserted into her left forearm. Patient received Ceftriaxone infusion without any problems. Peripheral IV flushed with Normal Saline and discontinued. Dry 2 x 2 gauze wrapped with Coban around IV site. All questions answered and patient was discharged home.

## 2021-06-12 NOTE — PROGRESS NOTES
Pt arrived to infusion clinic. IV access established with great blood return. Pt tolerated Ceftriaxone infusion well. IV flushed with saline and then removed, gauze and Coban applied. Pt ambulated out of infusion clinic independently.

## 2021-06-12 NOTE — TELEPHONE ENCOUNTER
I called and confirmed with [facility], the pt does not have coverage for home infusions the pt states that she cannot pass up this job and will not be able to continue with out patient infusions. Please advise on next steps/ alternative therapy. Pt informed of the above and I will call her back when I speak to the MD.

## 2021-06-12 NOTE — PROGRESS NOTES
Pt arrived to infusion clinic. IV access established with great blood return. Pt tolerated Ceftriaxone infusion well. IV flushed with saline and then removed, gauze and tape applied. Pt ambulated out of infusion clinic independently.

## 2021-06-12 NOTE — TELEPHONE ENCOUNTER
Phone Call:    D) Pt. called in this date re: hospitalization update.  Pt. Reports she is scheduled to be discharged from the hospital this date to go home. She reports being unsure if she'll be discharged in enough time to connect for group and requests to be excused for this reason if unable to attend. Pt. Reports plan to return to group on 10/19/20.    Staff Name and Title: MONIQUE Levin  Date:  10/15/2020  Time:  9:05 PM

## 2021-06-12 NOTE — PROGRESS NOTES
Patient tolerated IV  Antibiotic therapy and now per MD orders patient was going to oral antibiotic due to starting a new job tomorrow.

## 2021-06-12 NOTE — ANESTHESIA POSTPROCEDURE EVALUATION
Patient: Ama Roman  Procedure(s):  IRRIGATION AND DEBRIDEMENT, SHOULDER LEFT (Left)  Anesthesia type: No value filed.    Patient location: PACU2  Last vitals:   Vitals Value Taken Time   /85 10/14/20 0312   Temp 36.6  C (97.8  F) 10/14/20 0556   Pulse 94 10/14/20 0312   Resp 16 10/14/20 0312   SpO2 98 % 10/14/20 0312     Post vital signs: stable  Level of consciousness: awake and responds to simple questions  Post-anesthesia pain: pain controlled  Post-anesthesia nausea and vomiting: no  Pulmonary: unassisted, return to baseline  Cardiovascular: stable and blood pressure at baseline  Hydration: adequate  Anesthetic events: no    QCDR Measures:  ASA# 11 - Grazyna-op Cardiac Arrest: ASA11B - Patient did NOT experience unanticipated cardiac arrest  ASA# 12 - Grazyna-op Mortality Rate: ASA12B - Patient did NOT die  ASA# 13 - PACU Re-Intubation Rate: ASA13B - Patient did NOT require a new airway mgmt  ASA# 10 - Composite Anes Safety: ASA10A - No serious adverse event    Additional Notes:

## 2021-06-12 NOTE — TELEPHONE ENCOUNTER
Pt calling back, she starts her new job Tuesday and would like home infusions prior to then or to transition to an oral abx. The pt does NOT have a picc line due to hx of IVDU. The pt gets a peripheral IV each day for her infusion. I informed I will speak to the MD on what to do and contact her back.

## 2021-06-12 NOTE — TELEPHONE ENCOUNTER
VEEHI calling stating that she does not have any insurance coverage for IV abx in the home, the pt only has MA for mental health.

## 2021-06-12 NOTE — TELEPHONE ENCOUNTER
I called [facility] for them to check Benefits, they will call me tomorrow. I called LM for the pt to c/b to see when she would expect to start infusions and to inform of the above.

## 2021-06-12 NOTE — PROGRESS NOTES
Pt arrived to infusion clinic. IV access established with great blood return. Labs drawn. Pt tolerated Ceftriaxone infusion well. IV flushed with saline and then removed, gauze and Coban applied. Pt ambulated out of infusion clinic independently.
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
## 2021-06-13 NOTE — TELEPHONE ENCOUNTER
Phone Call:    D) [facility] - Ana Paula Bronson called this date and left message re: requesting call back for treatment progress update. Call back was made this date, counselor provided Pt.'s treatment progress update information including program phase 2 schedule, healthy discussion participation, and that no current/immediate concerns are identified.    Staff Name and Title: MONIQUE Levin  Date:  11/23/2020  Time:  9:42 AM

## 2021-06-13 NOTE — TELEPHONE ENCOUNTER
Patient called the clinic requesting results from ED visit dated 12/16/20.  Noted abnormal results and the labs have not been resulted.  Assisted patient to schedule a virtual/video visit with patient at 2pm today.  Patient wants to be called at 2pm for the appointment.  Patient verbalized understanding and is agreeable with the plan of care.
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
## 2021-06-13 NOTE — TELEPHONE ENCOUNTER
Pt called back and I informed of the below. Pt understands. Pt has already cancelled her appts with infusion. RX sent for clindamycin 150 mg three times a day x 12 days as the pt only has 300 mg capsules.
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
## 2021-06-13 NOTE — TELEPHONE ENCOUNTER
Phone Call:    D) Call was made this date to Parth Estrada CPS - Ana Paula Bronson, re: follow up of reported positive UA concerns. CPS reports that Pt. had a positive UA on 11/16/20 and confirmation from the lab results came back as positive for Amphetamine. CPS  reports plans to follow up with Pt. And attend scheduled court hearing on 12/2/20.    Staff Name and Title: MONIQUE Levin  Date:  12/1/2020  Time:  3:22 PM

## 2021-06-13 NOTE — TELEPHONE ENCOUNTER
Phone Call:    D) Par[facility] Estrada CPS - Ana Paula Bronson called this date and left message re: notice that one of Pt.'s last CPS UA results came back positive for methamphetamine. Counselor will follow up with call back.    Staff Name and Title: MONIQUE Levin  Date:  11/23/2020  Time:  9:30 PM

## 2021-06-13 NOTE — TELEPHONE ENCOUNTER
E-mail:    Pt. Sent e-mail this date informing counselor of a death in her family and requesting to be excused from group on 20 and 20 to attend to  planning and attendance.   Request is excepted, Pt. will be excused.    Staff Name and Title:   JOEY Levin, [facility]  Date: 2020  Time: 3:53 PM

## 2021-06-13 NOTE — PROGRESS NOTES
"Ama Roman is a 35 y.o. female who is being evaluated via a billable video visit.      The patient has been notified of following:     \"This video visit will be conducted via a call between you and your physician/provider. We have found that certain health care needs can be provided without the need for an in-person physical exam.  This service lets us provide the care you need with a video conversation.  If a prescription is necessary we can send it directly to your pharmacy.  If lab work is needed we can place an order for that and you can then stop by our lab to have the test done at a later time.    Video visits are billed at different rates depending on your insurance coverage. Please reach out to your insurance provider with any questions.    If during the course of the call the physician/provider feels a video visit is not appropriate, you will not be charged for this service.\"    Patient has given verbal consent to a Video visit? Yes  How would you like to obtain your AVS? AVS Preference: Mail a copy.  If dropped by the video visit, the video invitation should be sent to: Text to cell phone: [phone]  Will anyone else be joining your video visit? No    Video Start Time: 2:15 PM    HPI:  Patient is a 35-year-old female with recent left shoulder infection (septic arthritis) 2/2 IVDU requiring IV antibiotics.  Patient was in the ED recently and her ESR and CRP was found to be elevated.  Patient is calling today because she was supposed to have blood cultures drawn and wanted to discuss the results.  She is very worried that she might have a blood staph aureus infection.  She just does not want to get that sick again.  Is wondering if I can go over the blood work with her today.    A/P:  Review of her chart does not reveal any results of blood cultures.  It does appear that blood cultures were ordered but ultimately discontinued by provider in the ED.  Patient is frustrated by the and is very concerned " that she might be bacteremic.  Reassured patient that if she truly had staph aureus bacteremia, she would be having other symptoms including fevers, chills, nausea, vomiting and decreased appetite.  Given that patient feels improved from her ED visit is reassuring.  Her initial symptoms, including her ESR and CRP were most likely elevated in the setting of viral illness.  Patient was found to be COVID-19 negative during her recent ED visit.  Patient is somewhat reassured by it.  Told patient that we will get repeat ESR and CRP to make sure that is downtrending.  Patient is okay with that plan.  We will have nursing help her schedule a lab visit.    Video-Visit Details    Type of service:  Video Visit    Video End Time (time video stopped): 2:23 PM  Originating Location (pt. Location): Home    Distant Location (provider location):  [facility]     Platform used for Video Visit: Andrea Helms DO

## 2021-06-13 NOTE — TELEPHONE ENCOUNTER
12/21/2020 12:29PM: Patient calling in asking for her blood culture results from her 12/16/2020 ED visit. Advised the patient that she should follow up with her PCP regarding any ED lab follow up. The patient is insistent that she speak with the ED provider that saw her.   This RN connected the patient to [facility] ED and warm transferred the patient to a staff member who will speak with her.    Elisha Siddiqui RN  [facility] RN  Lung Nodule and ED Lab Result RN  Ph# [phone]

## 2021-06-14 NOTE — TELEPHONE ENCOUNTER
Phone Call:    D) Call was made to Pt. this date and message left requesting call back.    Pt. Called back this date and informed counselor that she had gone in for a UA on 1/12/21.    Staff Name and Title: MONIQUE Levin  Date:  1/13/2021  Time:  3:29 PM

## 2021-06-14 NOTE — TELEPHONE ENCOUNTER
Phone Call:    D) Par[facility] Estrada CPS - Ana Paula Bronson called in this date re: request updated UA result information. Counselor informed CPS  that the last UA in the system was done on 10/13 20. CPS  reports needing a current UA, counselor informed  she could request to the Pt. that she have a UA completed at any [facility] location because their are standing orders in place for alcohol/drug testing.  reports plans to follow up with the Pt. to make the request that she gets the UA done.      Staff Name and Title: MONIQUE Levin  Date:  12/28/2020  Time:  3:20 PM

## 2021-06-14 NOTE — TELEPHONE ENCOUNTER
Phone Call:    D) Call was made to Parth Bronson this date and message left re: 12/28/20 UA results negative for all substances.     Staff Name and Title: MONIQUE Levin  Date:  12/29/2020  Time:  1:09 PM

## 2021-06-15 NOTE — TELEPHONE ENCOUNTER
Phone Call:    D) Call was made to Pt. this date to follow up on group attendance and insurance status. VM full so unable to leave a message.    Staff Name and Title: MONIQUE Levin  Date:  2/4/2021  Time:  1:37 PM

## 2021-06-15 NOTE — TELEPHONE ENCOUNTER
Fax:    Counselor sent fax this date to [employer], paper work requesting funding to cover termed [facility].    Staff Name and Title:   JOEY Levin, [facility]  Date: 3/8/2021  Time: 9:43 PM

## 2021-06-15 NOTE — TELEPHONE ENCOUNTER
E-mail:    Pt. Sent e-mail to counselor this date  requesting treatment verification and update be sent to her Darnell CoCarlos . Counselor sent Pt. [employer] ARI[employer] paperwork to complete and counselor will follow up on her request.    Staff Name and Title:   JOEY Levin, [facility]  Date: 3/8/2021  Time: 12:41 PM

## 2021-06-15 NOTE — TELEPHONE ENCOUNTER
Phone Call:    D) Call was made to Pt. this date and message left re: planned in-person meeting, and requesting call back.    Pt. Called back this date reporting being unable to make it to plane [place]'s in-person meeting and requesting to reschedule for 2/24/21. Counselor agreed.    Staff Name and Title: MONIQUE Levin  Date:  2/23/2021  Time:  5:08 PM

## 2021-06-15 NOTE — TELEPHONE ENCOUNTER
Phone Call:    D) Call was made this date to Pt., re: [employer] request.  Counselor informed Pt. That she has been sent [employer] paperwork to complete.  After phone call Pt. Followed up and submitted completed paperwork.    Staff Name and Title:   MONIQUE Levin  Date:  3/1/2021  Time:  1:50 PM

## 2021-06-15 NOTE — TELEPHONE ENCOUNTER
Phone Call:    D) Call was made this date to Pt., re: follow up on ins./funding issues for treatment services. Pt. Agreed to plan a meeting in person with counselor for 2/23/21 to review and sign paperwork required to request [facility] funding.     Staff Name and Title: MONIQUE Levin  Date:  2/22/2021  Time:  4:47 PM

## 2021-06-17 NOTE — TELEPHONE ENCOUNTER
Phone Call:    D) Call was made this date to Pt., re: follow up on previously reported health issues. Pt. Reports she and son are COVID-19 positive and isolated at home to recover as instructed by the hospital. Pt. Requests for this reason and not yet having wifi conection in her new residence to be excused from group this date with plan to return 5/11/21.     Staff Name and Title:   MONIQUE Levin  Date:  5/4/2021  Time:  4:21 PM

## 2021-06-17 NOTE — TELEPHONE ENCOUNTER
"Patient is weeping, difficult to understand at first    Tested positive for covid one week ago    Fever 102.3    \"I am really hungry, like my stomach hurts, but I can't keep food down\"     Can keep liquids down most of the time  Able to keep down popsicles     \"I am just so thirsty and hungry and so weak!\"    Bad cough, when coughs head pounds    Chest pain   -rates 6/10  -Just started 3 days ago  -getting worse over last few days  -intermittent  -really only there with coughing or deep breaths    Difficulty breathing  -ok if laying in bed  -but awful if up and moving     No PCP     Triaged to a disposition of Go to ED. Patient is agreeable.     COVID 19 Nurse Triage Plan/Patient Instructions    Please be aware that novel coronavirus (COVID-19) may be circulating in the community. If you develop symptoms such as fever, cough, or SOB or if you have concerns about the presence of another infection including coronavirus (COVID-19), please contact your health care provider or visit  [email or web address].    Disposition/Instructions    ED Visit recommended. Follow protocol based instructions.      Bring Your Own Device:  Please also bring your smart device(s) (smart phones, tablets, laptops) and their charging cables for your personal use and to communicate with your care team during your visit.      Thank you for taking steps to prevent the spread of this virus.  o Limit your contact with others.  o Wear a simple mask to cover your cough.  o Wash your hands well and often.    Resources    [facility]: About COVID-19: [email or web address]    CDC: What to Do If You're Sick: www.cdc.gov/coronavirus/2019-ncov/about/steps-when-sick.html    CDC: Ending Home Isolation: www.cdc.gov/coronavirus/2019-ncov/hcp/disposition-in-home-patients.html     CDC: Caring for Someone: www.cdc.gov/coronavirus/2019-ncov/if-you-are-sick/care-for-someone.html     MD: Interim Guidance for Hospital Discharge to Home: " www.health.[facility].[place]/diseases/coronavirus/hcp/hospdischarge.pdf    [facility] clinical trials (COVID-19 research studies): clinicalaffairs.[facility]-clinical-trials     Below are the COVID-19 hotlines at the [place] Department of Health ([facility]). Interpreters are available.   o For health questions: Call 354-803-7833 or 1-398.231.9107 (7 a.m. to 7 p.m.)  o For questions about schools and childcare: Call 233-329-2278 or 1-707.641.8893 (7 a.m. to 7 p.m.)       Reason for Disposition    MILD difficulty breathing (e.g., minimal/no SOB at rest, SOB with walking, pulse <100)    Chest pain or pressure    Additional Information    Negative: SEVERE difficulty breathing (e.g., struggling for each breath, speaks in single words)    Negative: Difficult to awaken or acting confused (e.g., disoriented, slurred speech)    Negative: Bluish (or gray) lips or face now    Negative: Shock suspected (e.g., cold/pale/clammy skin, too weak to stand, low BP, rapid pulse)    Negative: Sounds like a life-threatening emergency to the triager    Negative: [1] COVID-19 exposure AND [2] has not completed COVID-19 vaccine series AND [3] no symptoms    Negative: [1] COVID-19 exposure AND [2] completed COVID-19 vaccine series (fully vaccinated) AND [3] no symptoms    Negative: COVID-19 vaccine reaction suspected (e.g., fever, headache, muscle aches) occurring during days 1-3 after getting vaccine    Negative: COVID-19 vaccine, questions about    Negative: [1] COVID-19 vaccine series completed (fully vaccinated) in past 3 months AND [2] new-onset of COVID-19 symptoms BUT [3] no known exposure    Negative: [1] Had lab test confirmed COVID-19 infection within last 3 monthsAND [2] new-onset of COVID-19 symptoms BUT [3] no known exposure    Negative: [1] Lives with someone known to have influenza (flu test positive) AND [2] flu-like symptoms (e.g., cough, runny nose, sore throat, SOB; with or without fever)    Negative: [1] Adult with  possible COVID-19 symptoms AND [2] triager concerned about severity of symptoms or other causes    Negative: COVID-19 and breastfeeding, questions about    Negative: SEVERE or constant chest pain or pressure (Exception: mild central chest pain, present only when coughing)    Negative: MODERATE difficulty breathing (e.g., speaks in phrases, SOB even at rest, pulse 100-120)    Negative: [1] Headache AND [2] stiff neck (can't touch chin to chest)    Protocols used: CORONAVIRUS (COVID-19) DIAGNOSED OR [id number]-Z-ER 3.25.21    Hina Soliman RN Triage Nurse Advisor

## 2021-07-03 NOTE — ADDENDUM NOTE
Addendum Note by Kapil Loaiza MD at 10/30/2020  4:03 PM     Author: Kapil Loaiza MD Service: -- Author Type: Physician    Filed: 10/30/2020  4:03 PM Encounter Date: 10/29/2020 Status: Signed    : Kapil Loaiza MD (Physician)    Addended by: KAPIL LOAIZA on: 10/30/2020 04:03 PM        Modules accepted: Orders

## 2021-07-03 NOTE — ADDENDUM NOTE
Addendum Note by Amanda Joseph RN at 11/13/2020 12:59 PM     Author: Amanda Joseph RN Service: -- Author Type: Registered Nurse    Filed: 11/13/2020 12:59 PM Encounter Date: 11/13/2020 Status: Signed    : Amanda Joseph RN (Registered Nurse)    Addended by: AMANDA JOSEPH on: 11/13/2020 12:59 PM        Modules accepted: Orders

## 2021-09-30 ENCOUNTER — TELEPHONE (OUTPATIENT)
Dept: ADDICTION MEDICINE | Facility: HOSPITAL | Age: 36
End: 2021-09-30

## 2021-09-30 NOTE — TELEPHONE ENCOUNTER
E-mail:    Counselor received an e-mail this date from Jori Jackson with [facility] requesting Pt.'s treatment discharge summary.  Counselor followed up faxing requested info this date.    MONIQUE Levin  Date: 9/30/2021  Time: 2:43 PM